# Patient Record
Sex: MALE | Race: WHITE | NOT HISPANIC OR LATINO | Employment: OTHER | URBAN - NONMETROPOLITAN AREA
[De-identification: names, ages, dates, MRNs, and addresses within clinical notes are randomized per-mention and may not be internally consistent; named-entity substitution may affect disease eponyms.]

---

## 2018-05-18 ENCOUNTER — OFFICE VISIT CONVERTED (OUTPATIENT)
Dept: FAMILY MEDICINE CLINIC | Age: 63
End: 2018-05-18
Attending: FAMILY MEDICINE

## 2019-02-26 ENCOUNTER — OFFICE VISIT CONVERTED (OUTPATIENT)
Dept: FAMILY MEDICINE CLINIC | Age: 64
End: 2019-02-26
Attending: FAMILY MEDICINE

## 2019-02-26 ENCOUNTER — HOSPITAL ENCOUNTER (OUTPATIENT)
Dept: OTHER | Facility: HOSPITAL | Age: 64
Discharge: HOME | End: 2019-02-26
Attending: FAMILY MEDICINE

## 2019-02-26 LAB
ALBUMIN SERPL-MCNC: 4.6 G/DL (ref 3.5–5)
ALBUMIN/GLOB SERPL: 1.6 {RATIO} (ref 1.4–2.6)
ALP SERPL-CCNC: 64 U/L (ref 56–155)
ALT SERPL-CCNC: 12 U/L (ref 10–40)
ANION GAP SERPL CALC-SCNC: 15 MMOL/L (ref 8–19)
AST SERPL-CCNC: 19 U/L (ref 15–50)
BILIRUB SERPL-MCNC: 0.99 MG/DL (ref 0.2–1.3)
BUN SERPL-MCNC: 18 MG/DL (ref 5–25)
BUN/CREAT SERPL: 18 {RATIO} (ref 6–20)
CALCIUM SERPL-MCNC: 9.3 MG/DL (ref 8.7–10.4)
CHLORIDE SERPL-SCNC: 99 MMOL/L (ref 99–111)
CHOLEST SERPL-MCNC: 191 MG/DL (ref 107–200)
CHOLEST/HDLC SERPL: 3 {RATIO} (ref 3–6)
CONV CO2: 28 MMOL/L (ref 22–32)
CONV TOTAL PROTEIN: 7.4 G/DL (ref 6.3–8.2)
CREAT UR-MCNC: 0.99 MG/DL (ref 0.7–1.2)
GFR SERPLBLD BASED ON 1.73 SQ M-ARVRAT: >60 ML/MIN/{1.73_M2}
GLOBULIN UR ELPH-MCNC: 2.8 G/DL (ref 2–3.5)
GLUCOSE SERPL-MCNC: 104 MG/DL (ref 70–99)
HDLC SERPL-MCNC: 63 MG/DL (ref 40–60)
LDLC SERPL CALC-MCNC: 113 MG/DL (ref 70–100)
OSMOLALITY SERPL CALC.SUM OF ELEC: 288 MOSM/KG (ref 273–304)
POTASSIUM SERPL-SCNC: 4.4 MMOL/L (ref 3.5–5.3)
SODIUM SERPL-SCNC: 138 MMOL/L (ref 135–147)
TRIGL SERPL-MCNC: 74 MG/DL (ref 40–150)
VLDLC SERPL-MCNC: 15 MG/DL (ref 5–37)

## 2019-08-26 ENCOUNTER — OFFICE VISIT CONVERTED (OUTPATIENT)
Dept: FAMILY MEDICINE CLINIC | Age: 64
End: 2019-08-26
Attending: FAMILY MEDICINE

## 2019-08-26 ENCOUNTER — HOSPITAL ENCOUNTER (OUTPATIENT)
Dept: OTHER | Facility: HOSPITAL | Age: 64
Discharge: HOME | End: 2019-08-26
Attending: FAMILY MEDICINE

## 2019-08-26 LAB
ALBUMIN SERPL-MCNC: 4.6 G/DL (ref 3.5–5)
ALBUMIN/GLOB SERPL: 1.8 {RATIO} (ref 1.4–2.6)
ALP SERPL-CCNC: 65 U/L (ref 56–155)
ALT SERPL-CCNC: 18 U/L (ref 10–40)
ANION GAP SERPL CALC-SCNC: 17 MMOL/L (ref 8–19)
AST SERPL-CCNC: 27 U/L (ref 15–50)
BILIRUB SERPL-MCNC: 1.21 MG/DL (ref 0.2–1.3)
BUN SERPL-MCNC: 15 MG/DL (ref 5–25)
BUN/CREAT SERPL: 15 {RATIO} (ref 6–20)
CALCIUM SERPL-MCNC: 9.7 MG/DL (ref 8.7–10.4)
CHLORIDE SERPL-SCNC: 100 MMOL/L (ref 99–111)
CHOLEST SERPL-MCNC: 160 MG/DL (ref 107–200)
CHOLEST/HDLC SERPL: 2.8 {RATIO} (ref 3–6)
CONV CO2: 26 MMOL/L (ref 22–32)
CONV TOTAL PROTEIN: 7.1 G/DL (ref 6.3–8.2)
CREAT UR-MCNC: 1 MG/DL (ref 0.7–1.2)
GFR SERPLBLD BASED ON 1.73 SQ M-ARVRAT: >60 ML/MIN/{1.73_M2}
GLOBULIN UR ELPH-MCNC: 2.5 G/DL (ref 2–3.5)
GLUCOSE SERPL-MCNC: 112 MG/DL (ref 70–99)
HDLC SERPL-MCNC: 58 MG/DL (ref 40–60)
LDLC SERPL CALC-MCNC: 85 MG/DL (ref 70–100)
OSMOLALITY SERPL CALC.SUM OF ELEC: 288 MOSM/KG (ref 273–304)
POTASSIUM SERPL-SCNC: 4.5 MMOL/L (ref 3.5–5.3)
SODIUM SERPL-SCNC: 138 MMOL/L (ref 135–147)
TRIGL SERPL-MCNC: 83 MG/DL (ref 40–150)
VLDLC SERPL-MCNC: 17 MG/DL (ref 5–37)

## 2020-02-27 ENCOUNTER — OFFICE VISIT CONVERTED (OUTPATIENT)
Dept: FAMILY MEDICINE CLINIC | Age: 65
End: 2020-02-27
Attending: NURSE PRACTITIONER

## 2020-03-02 ENCOUNTER — OFFICE VISIT CONVERTED (OUTPATIENT)
Dept: FAMILY MEDICINE CLINIC | Age: 65
End: 2020-03-02
Attending: FAMILY MEDICINE

## 2020-03-02 ENCOUNTER — HOSPITAL ENCOUNTER (OUTPATIENT)
Dept: OTHER | Facility: HOSPITAL | Age: 65
Discharge: HOME | End: 2020-03-02
Attending: FAMILY MEDICINE

## 2020-03-02 LAB
ALBUMIN SERPL-MCNC: 4.3 G/DL (ref 3.5–5)
ALBUMIN/GLOB SERPL: 1.3 {RATIO} (ref 1.4–2.6)
ALP SERPL-CCNC: 61 U/L (ref 56–155)
ALT SERPL-CCNC: 35 U/L (ref 10–40)
ANION GAP SERPL CALC-SCNC: 16 MMOL/L (ref 8–19)
AST SERPL-CCNC: 28 U/L (ref 15–50)
BASOPHILS # BLD MANUAL: 0.02 10*3/UL (ref 0–0.2)
BASOPHILS NFR BLD MANUAL: 0.3 % (ref 0–3)
BILIRUB SERPL-MCNC: 0.53 MG/DL (ref 0.2–1.3)
BUN SERPL-MCNC: 22 MG/DL (ref 5–25)
BUN/CREAT SERPL: 18 {RATIO} (ref 6–20)
CALCIUM SERPL-MCNC: 9.3 MG/DL (ref 8.7–10.4)
CHLORIDE SERPL-SCNC: 102 MMOL/L (ref 99–111)
CHOLEST SERPL-MCNC: 152 MG/DL (ref 107–200)
CHOLEST/HDLC SERPL: 3.7 {RATIO} (ref 3–6)
CONV CO2: 26 MMOL/L (ref 22–32)
CONV TOTAL PROTEIN: 7.6 G/DL (ref 6.3–8.2)
CREAT UR-MCNC: 1.22 MG/DL (ref 0.7–1.2)
DEPRECATED RDW RBC AUTO: 40 FL
EOSINOPHIL # BLD MANUAL: 0.03 10*3/UL (ref 0–0.7)
EOSINOPHIL NFR BLD MANUAL: 0.5 % (ref 0–7)
ERYTHROCYTE [DISTWIDTH] IN BLOOD BY AUTOMATED COUNT: 12.7 % (ref 11.5–14.5)
GFR SERPLBLD BASED ON 1.73 SQ M-ARVRAT: >60 ML/MIN/{1.73_M2}
GLOBULIN UR ELPH-MCNC: 3.3 G/DL (ref 2–3.5)
GLUCOSE SERPL-MCNC: 103 MG/DL (ref 70–99)
GRANS (ABSOLUTE): 4.15 10*3/UL (ref 2–8)
GRANS: 66.4 % (ref 30–85)
HBA1C MFR BLD: 13.9 G/DL (ref 14–18)
HCT VFR BLD AUTO: 41.8 % (ref 42–52)
HDLC SERPL-MCNC: 41 MG/DL (ref 40–60)
IMM GRANULOCYTES # BLD: 0.14 10*3/UL (ref 0–0.54)
IMM GRANULOCYTES NFR BLD: 2.2 % (ref 0–0.43)
LDLC SERPL CALC-MCNC: 72 MG/DL (ref 70–100)
LYMPHOCYTES # BLD MANUAL: 1.25 10*3/UL (ref 1–5)
LYMPHOCYTES NFR BLD MANUAL: 10.6 % (ref 3–10)
MCH RBC QN AUTO: 28.4 PG (ref 27–31)
MCHC RBC AUTO-ENTMCNC: 33.3 G/DL (ref 33–37)
MCV RBC AUTO: 85.5 FL (ref 80–96)
MONOCYTES # BLD AUTO: 0.66 10*3/UL (ref 0.2–1.2)
OSMOLALITY SERPL CALC.SUM OF ELEC: 294 MOSM/KG (ref 273–304)
PLATELET # BLD AUTO: 327 10*3/UL (ref 130–400)
PMV BLD AUTO: 8.9 FL (ref 7.4–10.4)
POTASSIUM SERPL-SCNC: 4.1 MMOL/L (ref 3.5–5.3)
RBC # BLD AUTO: 4.89 10*6/UL (ref 4.7–6.1)
SODIUM SERPL-SCNC: 140 MMOL/L (ref 135–147)
TRIGL SERPL-MCNC: 197 MG/DL (ref 40–150)
TSH SERPL-ACNC: 1.1 M[IU]/L (ref 0.27–4.2)
VARIANT LYMPHS NFR BLD MANUAL: 20 % (ref 20–45)
VLDLC SERPL-MCNC: 39 MG/DL (ref 5–37)
WBC # BLD AUTO: 6.25 10*3/UL (ref 4.8–10.8)

## 2020-04-20 ENCOUNTER — HOSPITAL ENCOUNTER (OUTPATIENT)
Dept: OTHER | Facility: HOSPITAL | Age: 65
Discharge: HOME | End: 2020-04-20
Attending: FAMILY MEDICINE

## 2020-09-01 ENCOUNTER — HOSPITAL ENCOUNTER (OUTPATIENT)
Dept: OTHER | Facility: HOSPITAL | Age: 65
Discharge: HOME | End: 2020-09-01
Attending: FAMILY MEDICINE

## 2020-09-01 ENCOUNTER — OFFICE VISIT CONVERTED (OUTPATIENT)
Dept: FAMILY MEDICINE CLINIC | Age: 65
End: 2020-09-01
Attending: FAMILY MEDICINE

## 2020-09-01 LAB
CHOLEST SERPL-MCNC: 168 MG/DL (ref 107–200)
CHOLEST/HDLC SERPL: 2.7 {RATIO} (ref 3–6)
HDLC SERPL-MCNC: 63 MG/DL (ref 40–60)
LDLC SERPL CALC-MCNC: 89 MG/DL (ref 70–100)
TRIGL SERPL-MCNC: 80 MG/DL (ref 40–150)
VLDLC SERPL-MCNC: 16 MG/DL (ref 5–37)

## 2021-03-01 ENCOUNTER — HOSPITAL ENCOUNTER (OUTPATIENT)
Dept: OTHER | Facility: HOSPITAL | Age: 66
Discharge: HOME | End: 2021-03-01
Attending: FAMILY MEDICINE

## 2021-03-01 ENCOUNTER — OFFICE VISIT CONVERTED (OUTPATIENT)
Dept: FAMILY MEDICINE CLINIC | Age: 66
End: 2021-03-01
Attending: FAMILY MEDICINE

## 2021-03-01 LAB
CHOLEST SERPL-MCNC: 174 MG/DL (ref 107–200)
CHOLEST/HDLC SERPL: 2.7 {RATIO} (ref 3–6)
HDLC SERPL-MCNC: 65 MG/DL (ref 40–60)
LDLC SERPL CALC-MCNC: 88 MG/DL (ref 70–100)
TRIGL SERPL-MCNC: 107 MG/DL (ref 40–150)
VLDLC SERPL-MCNC: 21 MG/DL (ref 5–37)

## 2021-03-08 ENCOUNTER — HOSPITAL ENCOUNTER (OUTPATIENT)
Dept: VACCINE CLINIC | Facility: HOSPITAL | Age: 66
Discharge: HOME | End: 2021-03-08
Attending: INTERNAL MEDICINE

## 2021-03-29 ENCOUNTER — HOSPITAL ENCOUNTER (OUTPATIENT)
Dept: VACCINE CLINIC | Facility: HOSPITAL | Age: 66
Discharge: HOME | End: 2021-03-29
Attending: INTERNAL MEDICINE

## 2021-05-18 NOTE — PROGRESS NOTES
Dariusz Cameron  1955     Office/Outpatient Visit    Visit Date: Tue, Sep 1, 2020 08:43 am    Provider: Jeffrey Poon MD (Assistant: Rosa Mccann LPN)    Location: [facility]        Electronically signed by Jeffrey Poon MD on  09/02/2020 08:31:17 PM                             Subjective:        CC: NEW MEDICATION, FROM [facility]     Eliquis 5mg BIDMr. Cameron is a 64 year old White male.  He is here today following a transition of care from an inpatient hospital: [facility].          HPI:       Mr. Cameron recently discharged from [facility] following hospitalization for an episode of transient confusion.  Patient states that he was driving to the [employer] course in [place] and ended up at the [employer] course.  He lived in [place] for many years knows his way around.  Just got confused.  Then he started feeling very anxious kind of like a panic attack he says.  Was experiencing some chest discomfort at the time.  Ended up driving home and his wife contacted us and we instructed her to take him to the hospital for evaluation of possible TIA.  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  He of course had EEG and MRA evaluations during the hospital stay as well.  We do have copies of those records.  I reviewed his labs from hospitalization and they looked okay but I could not find a lipid profile. Discharged home with instructions to get a follow-up MRI and follow-up with neurology but he says his appointment is not until April.  He is uneasy waiting that long to be seen.Told him that we do have a neurologist comes here to [place] that we can get an appointment with him he prefers to do that.Says that he took a week off work following his hospitalization.  Has felt good and in fact went to work yesterday and said he did not feel tired at the  "end of the day, which normally he would.  So I think the extra rest is probably done well.          Essential (primary) hypertension details; compliance with treatment has been good.  He is tolerating the medication well without side effects.  He did not bring his blood pressure diary, but says that pressures have been okay.            In regard to the mixed hyperlipidemia, compliance with treatment has been good.  He specifically denies associated symptoms, including muscle pain and weakness.      ROS:     CONSTITUTIONAL:  Negative for chills, fatigue and fever.      CARDIOVASCULAR:  Negative for chest pain, orthopnea, paroxysmal nocturnal dyspnea and pedal edema.      RESPIRATORY:  Negative for dyspnea and cough.      GASTROINTESTINAL:  Negative for abdominal pain, heartburn, constipation, diarrhea, and stool changes.      GENITOURINARY:  Negative for dysuria and polyuria.      PSYCHIATRIC:  Negative for anxiety and depression.          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      Positive for Colon Polyps - Grandfather.          Social History:     Occupation: (Self-Employed). ;     Marital Status:      Children: 3 step-children         Tobacco/Alcohol/Supplements:     Last Reviewed on 9/01/2020 08:45 AM by Rosa Mccann    Tobacco: He has a past history of cigarette smoking; quit date:  late 1990.  \"very seldom\"         Alcohol: Frequency:    \"seldom\";         Substance Abuse History:     Last Reviewed on 2/27/2020 12:41 PM by Veda Johnston    NEGATIVE         Allergies:     Last " Reviewed on 9/01/2020 08:45 AM by Rosa Mccann    No Known Allergies.        Current Medications:     Last Reviewed on 9/01/2020 08:46 AM by Rosa Mccann    Simvastatin 20 mg oral tablet [Take 1 tablet(s) by mouth daily]    lisinopriL-hydrochlorothiazide 10-12.5 mg oral tablet [Take 1 tablet(s) by mouth daily]    Eliquis 5 mg oral tablet [take 1 tablet (5 mg) by oral route 2 times per day]        Objective:        Vitals:         Current: 9/1/2020 8:48:53 AM    Ht:  6 ft, 1 in;  Wt: 177.8 lbs;  BMI: 23.5T: 96.9 F (temporal);  BP: 123/78 mm Hg (right arm, sitting);  P: 57 bpm (right arm (BP Cuff), sitting);  sCr: 1.22 mg/dL;  GFR: 62.45        Exams:     PHYSICAL EXAM:     GENERAL:  well developed and nourished; appropriately groomed; in no apparent distress;     EYES: Nonicteric and with unremarkable lids, iris and pupils;     E/N/T:  normal EACs, TMs, nasal/oral mucosa, teeth, gingiva, and oropharynx;     NECK: carotid exam reveals no bruits;     RESPIRATORY: normal respiratory rate and pattern with no distress; normal breath sounds with no rales, rhonchi, wheezes or rubs;     CARDIOVASCULAR: normal rate; rhythm is regular;  no systolic murmur;     LYMPHATIC: no enlargement of cervical or facial nodes;     MUSCULOSKELETAL: normal overall tone No pedal edema.;     NEUROLOGIC: mental status: alert and oriented x 3; cranial nerves II-XII grossly intact; coordination/cerebellar: normal finger-to-nose; ;  normal rapid alternating movements;  negative Romberg;  negative pronator drift;  No lateralizing deficit.;     PSYCHIATRIC:  appropriate affect and demeanor; normal speech pattern; grossly normal memory;         Assessment:         R41.0   Disorientation, unspecified       D68.9   Coagulation defect, unspecified       I10   Essential (primary) hypertension       E78.2   Mixed hyperlipidemia           ORDERS:         Lab Orders:       [id number]  [facility] Lipid Panel  (Send-Out)              Procedures Ordered:       REFER   Referral to Specialist or Other Facility  (Send-Out)                      Plan:         Disorientation, unspecifiedHe seems to be doing fine at the present.   We will get him into see Dr. Hull here in [place] at his request for follow-up.        REFERRALS:  Referral initiated to a neurologist ( Dr. Arabella Shaw ).            Orders:       REFER  Referral to Specialist or Other Facility  (Send-Out)              Coagulation defect, unspecifiedHe has the sagittal sinus thrombosis.Stay on the Eliquis as prescribed.         Essential (primary) hypertensionContinue on his current regimen        Mixed hyperlipidemia    LABORATORY:  Labs ordered to be performed today include lipid panel.            Orders:       [id number]  [facility]  (Send-Out)                  Other Patient Education Handouts:     Dragon transcription disclaimer:        Much of this encounter note is an electronic transcription/translation of spoken language to printed text.  The electronic translation of spoken language may permit erroneous, or at times, nonsensical words or phrases to be inadvertently transcribed.  Although I have reviewed the note for such errors, some may still exist.        Charge Capture:         Primary Diagnosis:     R41.0  Disorientation, unspecified           Orders:      [id number]  Office/outpatient visit; established patient, level 4  (In-House)              D68.9  Coagulation defect, unspecified     I10  Essential (primary) hypertension     E78.2  Mixed hyperlipidemia         ADDENDUMS:      ____________________________________    Addendum: 09/04/2020 10:23 AM - Jeffrey Poon        Remove    D68.9    Add           G08 (intracranial venous sinus (any) thrombosis)

## 2021-05-18 NOTE — PROGRESS NOTES
Dariusz Cameron  1955     Office/Outpatient Visit    Visit Date: Mon, Mar 1, 2021 08:29 am    Provider: Jeffrey Poon MD (Assistant: Mima Billingsley MA)    Location: [facility]        Electronically signed by Jeffrey Poon MD on  03/10/2021 08:22:23 AM                             Subjective:        CC: Mr. Cameron is a 65 year old White male.  This is a follow-up visit.  pt states he is not taking eliquis         HPI:       Dariusz is here for follow-up on his chronic conditions.  He has not had any more episodes of disorientation.  He did follow-up with neurology and repeat scan evidently showed no significant change so they are thinking the finding on CT was may be a anatomical variant rather than an acute thrombosis.  He was taken off of the Eliquis as a result.  Otherwise he says he is feeling fine.  He is back to work.  Tolerating his blood pressure and cholesterol medicines without difficulty.    ROS:     CONSTITUTIONAL:  Negative for chills, fatigue and fever.      CARDIOVASCULAR:  Negative for chest pain, orthopnea, paroxysmal nocturnal dyspnea and pedal edema.      RESPIRATORY:  Negative for dyspnea and cough.      GASTROINTESTINAL:  Negative for abdominal pain, heartburn, constipation, diarrhea, and stool changes.      GENITOURINARY:  Negative for dysuria and polyuria.      PSYCHIATRIC:  Negative for anxiety and depression.          Past Medical History / Family History / Social History:         Last Reviewed on 3/01/2021 09:13 AM by Jeffrey Poon    Past Medical History:             PAST MEDICAL HISTORY         Transient Confusion/Saggital Sinus Thrombus - Pathak's 8/2020         Surgical History:     Procedures:    Colonoscopy ( 2006 - normal )         Family History:         Positive for Coronary Artery Disease ( father ), Hyperlipidemia ( mother ) and Hypertension ( mother ).      Positive for Colon Polyps - Grandfather.          Social History:     Occupation:  "(Self-Employed). ;     Marital Status:      Children: 3 step-children         Tobacco/Alcohol/Supplements:     Last Reviewed on 3/01/2021 08:34 AM by Mima Billingsley    Tobacco: He has a past history of cigarette smoking; quit date:  late 1990.  \"very seldom\"         Alcohol: Frequency:    \"seldom\";         Substance Abuse History:     Last Reviewed on 2/27/2020 12:41 PM by Veda Johnston    NEGATIVE         Allergies:     Last Reviewed on 3/01/2021 08:34 AM by Mima Billingsley    No Known Allergies.        Current Medications:     Last Reviewed on 3/01/2021 08:34 AM by Mima Billingsley    Eliquis 5 mg oral tablet [take 1 tablet (5 mg) by oral route 2 times per day]    Simvastatin 20 mg oral tablet [Take 1 tablet(s) by mouth daily]    lisinopriL-hydrochlorothiazide 10-12.5 mg oral tablet [Take 1 tablet(s) by mouth daily]        Objective:        Vitals:         Current: 3/1/2021 8:33:50 AM    Ht:  6 ft, 1 in;  Wt: 183 lbs;  BMI: 24.1T: 96.7 F (temporal);  BP: 146/77 mm Hg (left arm, sitting);  P: 59 bpm (left arm (BP Cuff), sitting);  sCr: 1.22 mg/dL;  GFR: 62.44        Exams:     PHYSICAL EXAM:     GENERAL:  well developed and nourished; appropriately groomed; in no apparent distress;     EYES: Nonicteric and with unremarkable lids, iris and pupils;     E/N/T:  normal EACs, TMs, nasal/oral mucosa, teeth, gingiva, and oropharynx;     NECK: carotid exam reveals no bruits;     RESPIRATORY: normal respiratory rate and pattern with no distress; normal breath sounds with no rales, rhonchi, wheezes or rubs;     CARDIOVASCULAR: normal rate; rhythm is regular;  no systolic murmur;     LYMPHATIC: no enlargement of cervical or facial nodes;     MUSCULOSKELETAL: normal overall tone No pedal edema.;     NEUROLOGICAL:  cranial nerves, motor and sensory function, reflexes, gait and coordination are all intact;     PSYCHIATRIC:  appropriate affect and demeanor; normal speech pattern; grossly normal memory;         " Assessment:         I10   Essential (primary) hypertension       E78.2   Mixed hyperlipidemia       R41.0   Disorientation, unspecified           ORDERS:         Meds Prescribed:       [Refilled] lisinopriL-hydrochlorothiazide 20-12.5 mg oral tablet [take 1 tablet by oral route once daily], #90 (ninety) tablets, Refills: 0 (zero)       [Refilled] Simvastatin 20 mg oral tablet [Take 1 tablet(s) by mouth daily], #90 (ninety) tablets, Refills: 1 (one)         Lab Orders:       [id number]  [facility] Lipid Panel  (Send-Out)                      Plan:         Essential (primary) hypertensionBlood pressures little elevated.  Have him check a couple more blood pressures at home give us those results.  For now we will let him stay on the same medication.          Prescriptions:       [Refilled] lisinopriL-hydrochlorothiazide 20-12.5 mg oral tablet [take 1 tablet by oral route once daily], #90 (ninety) tablets, Refills: 0 (zero)         Mixed hyperlipidemiaReviewed his last lab work.  Continue on current medication    LABORATORY:  Labs ordered to be performed today include lipid panel.            Prescriptions:       [Refilled] Simvastatin 20 mg oral tablet [Take 1 tablet(s) by mouth daily], #90 (ninety) tablets, Refills: 1 (one)           Orders:       [id number]  [facility] Lipid Panel  (Send-Out)              Disorientation, unspecifiedNo recurrent episode.  He has been taken off of his Eliquis.  We will send for records from neurology and get a copy of his repeat CAT scan as well            Other Patient Education Handouts:     Dragon transcription disclaimer:        Much of this encounter note is an electronic transcription/translation of spoken language to printed text.  The electronic translation of spoken language may permit erroneous, or at times, nonsensical words or phrases to be inadvertently transcribed.  Although I have reviewed the note for such errors, some may still exist.        Charge Capture:         Primary  Diagnosis:     I10  Essential (primary) hypertension           Orders:      [id number]  Office/outpatient visit; established patient, level 4  (In-House)              E78.2  Mixed hyperlipidemia     R41.0  Disorientation, unspecified

## 2021-05-18 NOTE — PROGRESS NOTES
Dariusz Cameron  1955     Office/Outpatient Visit    Visit Date: Mon, Mar 2, 2020 11:51 am    Provider: Jeffrey Poon MD (Assistant: Rosa Mccann LPN)    Location: [facility]        Electronically signed by Jeffrey Poon MD on  03/02/2020 05:38:31 PM                             Subjective:        CC: Mr. Cameron is a 64 year old White male.  pt is here today for medication refills. pt has not had flu shot yet for this year, would like to get today if available.          HPI:           Mr. Cameron presents with essential (primary) hypertension.  Compliance with treatment has been good.  He is tolerating the medication well without side effects.  He did not bring his blood pressure diary, but says that pressures have been okay.            In regard to the mixed hyperlipidemia, compliance with treatment has been good.  He specifically denies associated symptoms, including muscle pain and weakness.        recent cough. no fever. appetite off and has had 10 lbs weight loss.  hx of smoking in past. He was also recently seen for evaluation of cough.  He had no fever.  His appetite had been off.  He is actually lost 10 pounds since that visit.  He does have a prior history of smoking.  He has not had any hemoptysis.  He was given a steroid injection at that time and says his cough has seemed to improve some but still lingers somewhat.    ROS:     CONSTITUTIONAL:  Positive for chills ( mild; last week, was seen by Veda ) and fatigue ( mild ).   Negative for fever.      CARDIOVASCULAR:  Negative for chest pain, orthopnea, paroxysmal nocturnal dyspnea and pedal edema.      RESPIRATORY:  Positive for cough.   Negative for dyspnea.      GASTROINTESTINAL:  Negative for abdominal pain, heartburn, constipation, diarrhea, and stool changes.      GENITOURINARY:  Negative for dysuria and polyuria.      PSYCHIATRIC:  Negative for anxiety and depression.          Past Medical History / Family History / Social  "History:         Last Reviewed on 3/02/2020 12:27 PM by Jeffrey Poon    Surgical History:     Procedures:    Colonoscopy ( 2006 - normal )         Family History:         Positive for Coronary Artery Disease ( father ), Hyperlipidemia ( mother ) and Hypertension ( mother ).      Positive for Colon Polyps - Grandfather.          Social History:     Occupation: (Self-Employed). ;     Marital Status:      Children: 3 step-children         Tobacco/Alcohol/Supplements:     Last Reviewed on 3/02/2020 12:07 PM by Jeffrey Poon    Tobacco: He has a past history of cigarette smoking; quit date:  late 1990.  \"very seldom\"         Alcohol: Frequency:    \"seldom\";         Substance Abuse History:     Last Reviewed on 2/27/2020 12:41 PM by Veda Johnston    NEGATIVE         Allergies:     Last Reviewed on 3/02/2020 11:58 AM by Rosa Mccann    No Known Allergies.        Current Medications:     Last Reviewed on 3/02/2020 11:58 AM by Rosa Mccann    Simvastatin 20mg Tablet [Take 1 tablet(s) by mouth daily]    Lisinopril/Hydrochlorothiazide 10mg/12.5mg Tablet [Take 1 tablet(s) by mouth daily]        Objective:        Vitals:         Current: 3/2/2020 11:58:25 AM    Ht:  6 ft, 1 in;  Wt: 174.4 lbs;  BMI: 23.0T: 97.3 F (oral);  BP: 128/77 mm Hg (right arm, sitting);  P: 72 bpm (right arm (BP Cuff), sitting);  sCr: 1 mg/dL;  GFR: 75.57        Exams:     PHYSICAL EXAM:     GENERAL:  well developed and nourished; appropriately groomed; in no apparent distress; Weight loss is noted    EYES: Nonicteric and with unremarkable lids, iris and pupils;     E/N/T:  normal EACs, TMs, nasal/oral mucosa, teeth, gingiva, and oropharynx;     NECK: carotid exam reveals no bruits;     RESPIRATORY: normal respiratory rate and pattern with no distress; no rales (\"crackles\") present; inspiratory wheezes in the right mid-lung field;     CARDIOVASCULAR: normal rate; rhythm is regular;  no systolic murmur;     LYMPHATIC: no " enlargement of cervical or facial nodes;     SKIN:  no significant rashes or lesions; no suspicious moles;     MUSCULOSKELETAL: normal overall tone No pedal edema.;     NEUROLOGIC: No lateralizing deficit.;     PSYCHIATRIC:  appropriate affect and demeanor; normal speech pattern; grossly normal memory;         Procedures:     Encounter for immunization    Regarding contraindications to an Influenza vaccine:        No contraindications were noted.              Assessment:         I10   Essential (primary) hypertension       E78.2   Mixed hyperlipidemia       R63.4   Abnormal weight loss       R05   Cough       Z23   Encounter for immunization           ORDERS:         Radiology/Test Orders:       [id number]  Radiologic exam chest 2 views  (Send-Out)              Lab Orders:       [id number]  HTNLP - [facility] CMP AND LIPID: [id number], [id number]  (Send-Out)            [id number]  BDCBC - [facility] CBC with 3 part diff  (Send-Out)            67548  TSH - [facility] TSH  (Send-Out)              Procedures Ordered:       [id number]  Immunization administration; one vaccine  (In-House)              Other Orders:       [id number]  Influenza virus vaccine, quadrivalent, split virus, preservative free 3 years of age & older  (In-House)                      Plan:         Essential (primary) hypertensionContinue on current medication seems to be doing well.  Also keep in mind possibility lisinopril associated with cough if fails to improve        Mixed hyperlipidemia    LABORATORY:  Labs ordered to be performed today include HTN/Lipid Panel: CMP, Lipid.            Orders:       [id number]  HTNLP - [facility] CMP AND LIPID: [id number], [id number]  (Send-Out)              Abnormal weight loss    LABORATORY:  Labs ordered to be performed today include CBC and TSH.            Orders:       [id number]  BDCBC - [facility] CBC with 3 part diff  (Send-Out)            44123  TSH - [facility] TSH  (Send-Out)              CoughSymptomatically he is improved which is encouraging.  However he has had the weight loss and noted to  have wheeze on exam which is concerning.  I am getting a chest x-ray for further evaluation in addition to the labs as noted above.If labs and x-ray look okay but his cough persist or worsens of asked him to call back and let me know.  We might give him a trial of albuterol for treatment at that point.        RADIOLOGY:  I have ordered a chest x-ray (PA and lateral) to be done today.            Orders:       [id number]  Radiologic exam chest 2 views  (Send-Out)              Encounter for immunization          Immunizations:       00571  Influenza virus vaccine, quadrivalent, split virus, preservative free 3 years of age & older  (In-House)                Dose (ml): 0.5  Site: left deltoid  Route: intramuscular  Administered by: Taylor Mendoza          : [employer]  Lot #: [id number]  Exp: 06/30/2020          NDC: [address]-98      11497  Immunization administration; one vaccine  (In-House)                  Other Patient Education Handouts:     Dragon transcription disclaimer:        Much of this encounter note is an electronic transcription/translation of spoken language to printed text.  The electronic translation of spoken language may permit erroneous, or at times, nonsensical words or phrases to be inadvertently transcribed.  Although I have reviewed the note for such errors, some may still exist.        Charge Capture:         Primary Diagnosis:     I10  Essential (primary) hypertension           Orders:      [id number]  Office/outpatient visit; established patient, level 4  (In-House)              E78.2  Mixed hyperlipidemia     R63.4  Abnormal weight loss     R05  Cough     Z23  Encounter for immunization           Orders:      [id number]  Influenza virus vaccine, quadrivalent, split virus, preservative free 3 years of age & older  (In-House)            91591  Immunization administration; one vaccine  (In-House)

## 2021-05-18 NOTE — PROGRESS NOTES
Dariusz Cameron 1955     Office/Outpatient Visit    Visit Date: Mon, Aug 26, 2019 09:20 am    Provider: Jeffrey Poon MD (Assistant: Sarah Spurling, MA)    Location: [facility]        Electronically signed by Jeffrey Poon MD on  08/26/2019 10:13:34 AM                             SUBJECTIVE:        CC:     Mr. Cameron is a 63 year old White male.  This is a follow-up visit.          HPI:         Patient to be evaluated for hypertension.  He did not bring his blood pressure diary, but says that pressures have been okay.  He is tolerating the medication well without side effects.  Compliance with treatment has been good.  He did a two mile run the other day and was tired a few days later.         Dx with mixed hyperlipidemia; compliance with treatment has been good.  He specifically denies associated symptoms, including muscle pain and weakness.      ROS:     CONSTITUTIONAL:  Negative for chills, fatigue and fever.      CARDIOVASCULAR:  Negative for chest pain, orthopnea, paroxysmal nocturnal dyspnea and pedal edema.      RESPIRATORY:  Negative for dyspnea and cough.      GASTROINTESTINAL:  Negative for abdominal pain, heartburn, constipation, diarrhea, and stool changes.      GENITOURINARY:  Negative for dysuria and polyuria.      PSYCHIATRIC:  Negative for anxiety and depression.          PMH/FMH/SH:     Last Reviewed on 8/26/2019 10:03 AM by Jeffrey Poon    Surgical History:     Procedures:    Colonoscopy ( 2006 - normal )         Family History:         Positive for Coronary Artery Disease ( father ), Hyperlipidemia ( mother ) and Hypertension ( mother ).      Positive for Colon Polyps - Grandfather.          Social History:     Occupation: (Self-Employed). ;     Marital Status:      Children: 3 step-children         Tobacco/Alcohol/Supplements:     Last Reviewed on 8/26/2019 09:22 AM by Spurling, Sarah C    Tobacco: He has a past history of cigarette smoking; quit  "date:  late 1990s, very seldom.  \"very seldom\"         Substance Abuse History:     NEGATIVE             Allergies:     Last Reviewed on 8/26/2019 09:22 AM by Spurling, Sarah C      No Known Drug Allergies.         Current Medications:     Last Reviewed on 8/26/2019 09:22 AM by Spurling, Sarah C    Lisinopril/Hydrochlorothiazide 10mg/12.5mg Tablet Take 1 tablet(s) by mouth daily     Simvastatin 20mg Tablet Take 1 tablet(s) by mouth daily         OBJECTIVE:        Vitals:         Current: 8/26/2019 9:25:30 AM    Ht:  6 ft, 1 in;  Wt: 176 lbs;  BMI: 23.2    T: 97.9 F (oral);  BP: 120/76 mm Hg (left arm, sitting);  P: 54 bpm (left arm (BP Cuff), sitting);  sCr: 0.99 mg/dL;  GFR: 77.58        Exams:     PHYSICAL EXAM:     GENERAL:  well developed and nourished; appropriately groomed; in no apparent distress;     EYES: Nonicteric and with unremarkable lids, iris and pupils;     E/N/T:  normal EACs, TMs, nasal/oral mucosa, teeth, gingiva, and oropharynx;     NECK: carotid exam reveals no bruits;     RESPIRATORY: normal respiratory rate and pattern with no distress; normal breath sounds with no rales, rhonchi, wheezes or rubs;     CARDIOVASCULAR: normal rate; rhythm is regular;  no systolic murmur;     LYMPHATIC: no enlargement of cervical or facial nodes;     SKIN:  no significant rashes or lesions; no suspicious moles;     MUSCULOSKELETAL: normal overall tone No pedal edema.;     NEUROLOGIC: No lateralizing deficit.;     PSYCHIATRIC:  appropriate affect and demeanor; normal speech pattern; grossly normal memory;         ASSESSMENT           401.1   I10  Hypertension              DDx:     272.2   E78.2  Mixed hyperlipidemia              DDx:     V79.0   Z13.31  Screening for depression              DDx:         ORDERS:         Meds Prescribed:       Refill of: Lisinopril/Hydrochlorothiazide 10mg/12.5mg Tablet Take 1 tablet(s) by mouth daily  #90 (Ninety) tablet(s) Refills: 1       Refill of: Simvastatin 20mg Tablet Take 1 " tablet(s) by mouth daily  #90 (Ninety) tablet(s) Refills: 1         Lab Orders:       [id number]  HTN - [facility] CMP AND LIPID: [id number]  (Send-Out)           Other Orders:         Depression screen negative  (In-House)                   PLAN:          Hypertension           Prescriptions:       Refill of: Lisinopril/Hydrochlorothiazide 10mg/12.5mg Tablet Take 1 tablet(s) by mouth daily  #90 (Ninety) tablet(s) Refills: 1       Refill of: Simvastatin 20mg Tablet Take 1 tablet(s) by mouth daily  #90 (Ninety) tablet(s) Refills: 1          Mixed hyperlipidemia     LABORATORY:  Labs ordered to be performed today include HTN/Lipid Panel: CMP, Lipid.            Orders:       [id number]  HTN - [facility] CMP AND LIPID: [id number], [id number]  (Send-Out)            Screening for depression     MIPS PHQ-9 Depression Screening: Completed form scanned and in chart; Total Score 1; Negative Depression Screen           Orders:         Depression screen negative  (In-House)               CHARGE CAPTURE           **Please note: ICD descriptions below are intended for billing purposes only and may not represent clinical diagnoses**        Primary Diagnosis:         401.1 Hypertension            I10    Essential (primary) hypertension              Orders:          [id number]   Office/outpatient visit; established patient, level 3  (In-House)           272.2 Mixed hyperlipidemia            E78.2    Mixed hyperlipidemia    V79.0 Screening for depression            Z13.31    Encounter for screening for depression              Orders:             Depression screen negative  (In-House)

## 2021-05-18 NOTE — PROGRESS NOTES
Dariusz Cameron  1955     Office/Outpatient Visit    Visit Date: Thu, Feb 27, 2020 12:18 pm    Provider: Veda Johnston N.P. (Assistant: Lauren Koehler MA)    Location: [facility]        Electronically signed by Veda Johnston N.P. on  02/27/2020 01:35:35 PM                             Subjective:        CC: Mr. Cameron is a 64 year old White male.  He presents with fatigue and weakness, no appetite x 5 days, he states he has lost 8 pounds.          HPI: 64-year-old male presenting to clinic complaining of 5-day history of fatigue, decreased appetite, nasal congestion and mild cough at night.  Patient denies fever, chills, night sweats, shortness of air or chest pain.  Wife had same symptoms earlier this week. Pt has not tried any otc medications. States he didn't feel as if had the energy to come to office today.    ROS:     CONSTITUTIONAL:  Negative for unintentional weight loss ( 8 pounds in 1 week pounds ).      EYES:  Negative for blurred vision.      E/N/T:  Negative for ear pain, diminished hearing, frequent rhinorrhea, sinus pressure and sore throat.      CARDIOVASCULAR:  Negative for chest pain, dizziness and palpitations.      RESPIRATORY:  Negative for dyspnea, hemoptysis and frequent wheezing.      GASTROINTESTINAL:  Negative for abdominal pain, constipation, diarrhea, nausea and vomiting.      GENITOURINARY:  Negative for dysuria.      MUSCULOSKELETAL:  Negative for arthralgias and myalgias.      INTEGUMENTARY:  Negative for rash.      NEUROLOGICAL:  Positive for weakness ( generalized ).   Negative for dizziness, fainting, headaches or paresthesias.          Past Medical History / Family History / Social History:         Last Reviewed on 2/27/2020 12:41 PM by Veda Johnston    Surgical History:     Procedures:    Colonoscopy ( 2006 - normal )         Family History:         Positive for Coronary Artery Disease ( father ), Hyperlipidemia ( mother ) and Hypertension (  "mother ).      Positive for Colon Polyps - Grandfather.          Social History:     Occupation: (Self-Employed). ;     Marital Status:      Children: 3 step-children         Tobacco/Alcohol/Supplements:     Last Reviewed on 2/27/2020 12:41 PM by Veda Johnston    Tobacco: He has a past history of cigarette smoking; quit date:  late 1990.  \"very seldom\"         Alcohol: Frequency:    \"seldom\";         Substance Abuse History:     Last Reviewed on 2/27/2020 12:41 PM by Veda Johnston    NEGATIVE         Immunizations:     zzFluzone pf-quadrivalent 3 and up 2/3/2014        Allergies:     Last Reviewed on 2/27/2020 12:41 PM by Veda Johnston    No Known Allergies.        Current Medications:     Last Reviewed on 2/27/2020 12:41 PM by Veda Johnsotn    Simvastatin 20mg Tablet [Take 1 tablet(s) by mouth daily]    Lisinopril/Hydrochlorothiazide 10mg/12.5mg Tablet [Take 1 tablet(s) by mouth daily]        Objective:        Vitals:         Current: 2/27/2020 12:24:31 PM    Ht:  6 ft, 1 in;  Wt: 175 lbs;  BMI: 23.1T: 97.4 F (oral);  BP: 106/61 mm Hg (left arm, sitting);  P: 69 bpm (left arm (BP Cuff), sitting);  sCr: 1 mg/dL;  GFR: 75.68        Exams:     PHYSICAL EXAM:     GENERAL: Vitals recorded well developed, well nourished;  well groomed;  no apparent distress;     EYES: conjunctiva and cornea are normal; PERRLA;     E/N/T: OROPHARYNX:  normal mucosa, dentition, gingiva, and posterior pharynx;     NECK: range of motion is normal; thyroid exam reveals not enlarged;     RESPIRATORY: normal respiratory rate and pattern with no distress; normal breath sounds with no rales, rhonchi, wheezes or rubs;     CARDIOVASCULAR: normal rate; rhythm is regular;  no systolic murmur; no edema;     LYMPHATIC: no enlargement of cervical or facial nodes;     SKIN:  no rash; no jaundice, good turgor;     MUSCULOSKELETAL: normal gait; normal overall tone     NEUROLOGIC: mental status: alert and oriented x 3;         " Lab/Test Results:         Mono: Negative (02/27/2020),     Performed by:: TL (02/27/2020),             Assessment:         B33.8   Other specified viral diseases       R53.83   Other fatigue           ORDERS:         Lab Orders:       [id number]  Rapid mono test  (In-House)              Procedures Ordered:       [id number]  Collection of capillary blood specimen (eg, finger, heel, ear stick)  (In-House)              Other Orders:       [id number]  Therapeutic injection  (In-House)                      Plan:         Other specified viral diseasesNo evidence of bacterial infection at this time. Explained to patient that antibiotic will not help.  Patient has a routine scheduled appointment with PCP, Dr. Poon on Monday. Will defer any laboratory testing today for fatigue to see if treatment helps. Encourage patient to take Zyrtec and Flonase daily.  Encouraged patient to try Benadryl at night to help with congestion and mild cough at night. Steroid injection given in office today.  Increase fluid intake to avoid dehydration.    Steroids Kenalog 40 mg 1 ml           Orders:       [id number]  Therapeutic injection  (In-House)              Other fatigueMonospot performed in office today. Negative.    LABORATORY:  Labs ordered to be performed today include monospot.            Orders:       [id number]  Rapid mono test  (In-House)            47381  Collection of capillary blood specimen (eg, finger, heel, ear stick)  (In-House)                  Charge Capture:         Primary Diagnosis:     B33.8  Other specified viral diseases           Orders:      [id number]  Office/outpatient visit; established patient, level 3  (In-House)            [id number]  Therapeutic injection  (In-House)              R53.83  Other fatigue           Orders:      [id number]  Rapid mono test  (In-House)            77883  Collection of capillary blood specimen (eg, finger, heel, ear stick)  (In-House)                  ADDENDUMS:      ____________________________________    Addendum:  03/11/2020 09:23 AM - Lauren Koehler        Name of medication/dose : Kenalog, 40mg     Route: Left Deltoid     Administered by: KG     Lot # [id number]    Expiration date: 08/01/2021

## 2021-05-18 NOTE — PROGRESS NOTES
Dariusz Cameron 1955     Office/Outpatient Visit    Visit Date: Fri, May 18, 2018 12:24 pm    Provider: Jeffrey Poon MD (Assistant: Thelma Mcclelland MA)    Location: [facility]        Electronically signed by Jeffrey Poon MD on  05/28/2018 11:22:53 AM                             SUBJECTIVE:        CC:     Mr. Cameron is a 62 year old White male.  This is a follow-up visit.  check up for med refills         HPI:         Patient presents with hypertension.  Mr. Cameron does not check his blood pressure other than at his clinic appointments.  He is tolerating the medication well without side effects.  Compliance with treatment has been good.          With regard to the mixed hyperlipidemia, compliance with treatment has been good.  He specifically denies associated symptoms, including muscle pain and weakness.      ROS:     CONSTITUTIONAL:  Negative for chills, fatigue, fever and weight change.      CARDIOVASCULAR:  Negative for chest pain, orthopnea, paroxysmal nocturnal dyspnea and pedal edema.      RESPIRATORY:  Negative for dyspnea and cough.      GASTROINTESTINAL:  Negative for abdominal pain, heartburn, constipation, diarrhea, and stool changes.      GENITOURINARY:  Negative for dysuria and polyuria.      PSYCHIATRIC:  Negative for anxiety and depression.          PMH/FMH/SH:     Last Reviewed on 12/28/2012 01:41 PM by Joann Toro    Surgical History:     Procedures:    Colonoscopy ( 2006 - normal )         Family History:         Positive for Coronary Artery Disease ( father ), Hyperlipidemia ( mother ) and Hypertension ( mother ).      Positive for Colon Polyps - Grandfather.          Social History:     Occupation: (Self-Employed). ;     Marital Status:      Children: 3 step-children         Tobacco/Alcohol/Supplements:     Last Reviewed on 3/13/2017 11:46 AM by Enmanuel Venegas    Tobacco: He has a past history of cigarette smoking; quit date:  late 1990s, very  "seldom.  \"very seldom\"         Substance Abuse History:     NEGATIVE             Allergies:     Last Reviewed on 5/18/2018 12:28 PM by Thelma Mcclelland      No Known Drug Allergies.         Current Medications:     Last Reviewed on 5/18/2018 12:28 PM by Thelma Mcclelland    Lisinopril 10mg Tablet Take 1 tablet(s) by mouth daily     Simvastatin 20mg Tablet Take 1 tablet(s) by mouth daily         OBJECTIVE:        Vitals:         Current: 5/18/2018 12:26:46 PM    Ht:  6 ft, 1 in;  Wt: 182 lbs;  BMI: 24.0    T: 98.5 F (oral);  BP: 144/82 mm Hg (left arm, standing);  P: 53 bpm (left arm (BP Cuff), sitting);  sCr: 1.12 mg/dL;  GFR: 70.42        Repeat:     1:09:33 PM     BP:   150/90mm Hg (left arm, sitting, by stiles)         Exams:     PHYSICAL EXAM:     GENERAL:  well developed and nourished; appropriately groomed; in no apparent distress;     EYES: Nonicteric and with unremarkable lids, iris and pupils;     E/N/T:  normal EACs, TMs, nasal/oral mucosa, teeth, gingiva, and oropharynx;     NECK: carotid exam reveals no bruits;     RESPIRATORY: normal respiratory rate and pattern with no distress; normal breath sounds with no rales, rhonchi, wheezes or rubs;     CARDIOVASCULAR: normal rate; rhythm is regular;  no systolic murmur;     LYMPHATIC: no enlargement of cervical or facial nodes;     MUSCULOSKELETAL: normal overall tone No pedal edema.;     NEUROLOGIC: No lateralizing deficit.;     PSYCHIATRIC:  appropriate affect and demeanor; normal speech pattern; grossly normal memory;         ASSESSMENT           401.1   I10  Hypertension              DDx:     272.2   E78.2  Mixed hyperlipidemia              DDx:         ORDERS:         Meds Prescribed:       Lisinopril/Hydrochlorothiazide 10mg/12.5mg Tablet Take 1 tablet(s) by mouth daily  #90 (Ninety) tablet(s) Refills: 0       Refill of: Simvastatin 20mg Tablet Take 1 tablet(s) by mouth daily  #90 (Ninety) tablet(s) Refills: 0         Lab Orders:       [id number]  HTNLP " [facility] CMP AND LIPID: [id number], [id number]  (Send-Out)                   PLAN:          Hypertension add back the HCTZ           Prescriptions:       Lisinopril/Hydrochlorothiazide 10mg/12.5mg Tablet Take 1 tablet(s) by mouth daily  #90 (Ninety) tablet(s) Refills: 0          Mixed hyperlipidemia     LABORATORY:  Labs ordered to be performed today include HTN/Lipid Panel: CMP, Lipid.            Prescriptions:       Refill of: Simvastatin 20mg Tablet Take 1 tablet(s) by mouth daily  #90 (Ninety) tablet(s) Refills: 0           [facility]:       [id number] [id number][facility] [id number]  (Send-Out)               CHARGE CAPTURE           **Please note: ICD descriptions below are intended for billing purposes only and may not represent clinical diagnoses**        Primary Diagnosis:         401.1 Hypertension            I10    Essential (primary) hypertension              Orders:          [id number]   Office/outpatient visit; established patient, level 3  (In-House)           272.2 Mixed hyperlipidemia            E78.2    Mixed hyperlipidemia

## 2021-05-18 NOTE — PROGRESS NOTES
"Dariusz Cameron 1955     Office/Outpatient Visit    Visit Date: Tue, Feb 26, 2019 11:01 am    Provider: Jeffrey Poon MD (Assistant: Sarah Spurling, MA)    Location: [facility]        Electronically signed by Jeffrey Poon MD on  02/27/2019 08:52:53 AM                             SUBJECTIVE:        CC:     Mr. Cameron is a 63 year old White male.  This is a follow-up visit.  Med refills.          HPI:         Mr. Cameron presents with hypertension.  Compliance with treatment has been good; he takes his medication as directed and follows up as directed.  Although he has no formal exercise routine, he is very active in his day-to-day work.         Mixed hyperlipidemia details; compliance with treatment has been good; he takes his medication as directed and follows up as directed.      ROS:     CONSTITUTIONAL:  Negative for chills, fatigue, fever and weight change.      CARDIOVASCULAR:  Negative for chest pain, orthopnea, paroxysmal nocturnal dyspnea and pedal edema.      RESPIRATORY:  Negative for dyspnea and cough.      GASTROINTESTINAL:  Negative for abdominal pain, heartburn, constipation, diarrhea, and stool changes.      GENITOURINARY:  Negative for dysuria and polyuria.      PSYCHIATRIC:  Negative for anxiety and depression.          PMH/FMH/SH:     Last Reviewed on 12/28/2012 01:41 PM by Joann Toro    Surgical History:     Procedures:    Colonoscopy ( 2006 - normal )         Family History:         Positive for Coronary Artery Disease ( father ), Hyperlipidemia ( mother ) and Hypertension ( mother ).      Positive for Colon Polyps - Grandfather.          Social History:     Occupation: (Self-Employed). ;     Marital Status:      Children: 3 step-children         Tobacco/Alcohol/Supplements:     Last Reviewed on 5/18/2018 12:28 PM by Thelma Mcclelland    Tobacco: He has a past history of cigarette smoking; quit date:  late 1990s, very seldom.  \"very seldom\"         " Substance Abuse History:     NEGATIVE             Allergies:     Last Reviewed on 5/18/2018 12:28 PM by Thelma Mcclelland      No Known Drug Allergies.         Current Medications:     Last Reviewed on 5/18/2018 12:28 PM by Thelma Mcclelland    Lisinopril/Hydrochlorothiazide 10mg/12.5mg Tablet Take 1 tablet(s) by mouth daily     Simvastatin 20mg Tablet Take 1 tablet(s) by mouth daily         OBJECTIVE:        Vitals:         Current: 2/26/2019 11:06:35 AM    Ht:  6 ft, 1 in;  Wt: 183 lbs;  BMI: 24.1    T: 98.2 F (oral);  BP: 117/72 mm Hg (left arm, sitting);  P: 56 bpm (left arm (BP Cuff), sitting);  sCr: 0.98 mg/dL;  GFR: 79.69        Exams:     PHYSICAL EXAM:     GENERAL:  well developed and nourished; appropriately groomed; in no apparent distress;     EYES: Nonicteric and with unremarkable lids, iris and pupils;     NECK: carotid exam reveals no bruits;     RESPIRATORY: normal respiratory rate and pattern with no distress; normal breath sounds with no rales, rhonchi, wheezes or rubs;     CARDIOVASCULAR: normal rate; rhythm is regular;  no systolic murmur;     LYMPHATIC: no enlargement of cervical or facial nodes;     MUSCULOSKELETAL: normal overall tone No pedal edema.;     NEUROLOGIC: No lateralizing deficit.;     PSYCHIATRIC:  appropriate affect and demeanor; normal speech pattern; grossly normal memory;         ASSESSMENT           401.1   I10  Hypertension              DDx:     272.2   E78.2  Mixed hyperlipidemia              DDx:         ORDERS:         Meds Prescribed:       Refill of: Simvastatin 20mg Tablet Take 1 tablet(s) by mouth daily  #90 (Ninety) tablet(s) Refills: 1       Refill of: Lisinopril/Hydrochlorothiazide 10mg/12.5mg Tablet Take 1 tablet(s) by mouth daily  #90 (Ninety) tablet(s) Refills: 1         Lab Orders:       [id number]  HTNLP - [facility] CMP AND LIPID: [id number], [id number]  (Send-Out)         APPTO  Appointment need  (In-House)                   PLAN: decline Hep A vaccine          Hypertension          FOLLOW-UP: Schedule a follow-up visit in 6 months..            Prescriptions:       Refill of: Lisinopril/Hydrochlorothiazide 10mg/12.5mg Tablet Take 1 tablet(s) by mouth daily  #90 (Ninety) tablet(s) Refills: 1           Orders:       APPTO  Appointment need  (In-House)            Mixed hyperlipidemia     LABORATORY:  Labs ordered to be performed today include HTN/Lipid Panel: CMP, Lipid.            Prescriptions:       Refill of: Simvastatin 20mg Tablet Take 1 tablet(s) by mouth daily  #90 (Ninety) tablet(s) Refills: 1           Orders:       [id number]  HTN - [facility] CMP AND LIPID: [id number], [id number]  (Send-Out)               Other Orders: Dragon transcription disclaimer:        Much of this encounter note is an electronic transcription/translation of spoken language to printed text.  The electronic translation of spoken language may permit erroneous, or at times, nonsensical words or phrases to be inadvertently transcribed.  Although I have reviewed the note for such errors, some may still exist.         Patient Recommendations:        For  Hypertension:     Schedule a follow-up visit in 6 months.                APPOINTMENT INFORMATION:        Monday Tuesday Wednesday Thursday Friday Saturday Sunday            Time:___________________AM  PM   Date:_____________________             CHARGE CAPTURE           **Please note: ICD descriptions below are intended for billing purposes only and may not represent clinical diagnoses**        Primary Diagnosis:         401.1 Hypertension            I10    Essential (primary) hypertension              Orders:          [id number]   Office/outpatient visit; established patient, level 3  (In-House)             APPTO   Appointment need  (In-House)           272.2 Mixed hyperlipidemia            E78.2    Mixed hyperlipidemia

## 2021-07-01 VITALS
DIASTOLIC BLOOD PRESSURE: 72 MMHG | WEIGHT: 183 LBS | BODY MASS INDEX: 24.25 KG/M2 | HEART RATE: 56 BPM | TEMPERATURE: 98.2 F | HEIGHT: 73 IN | SYSTOLIC BLOOD PRESSURE: 117 MMHG

## 2021-07-01 VITALS
TEMPERATURE: 98.5 F | HEART RATE: 53 BPM | DIASTOLIC BLOOD PRESSURE: 90 MMHG | SYSTOLIC BLOOD PRESSURE: 150 MMHG | HEIGHT: 73 IN | BODY MASS INDEX: 24.12 KG/M2 | WEIGHT: 182 LBS

## 2021-07-01 VITALS
BODY MASS INDEX: 23.33 KG/M2 | TEMPERATURE: 97.9 F | HEART RATE: 54 BPM | HEIGHT: 73 IN | WEIGHT: 176 LBS | DIASTOLIC BLOOD PRESSURE: 76 MMHG | SYSTOLIC BLOOD PRESSURE: 120 MMHG

## 2021-07-02 VITALS
HEART RATE: 59 BPM | WEIGHT: 183 LBS | DIASTOLIC BLOOD PRESSURE: 77 MMHG | TEMPERATURE: 96.7 F | SYSTOLIC BLOOD PRESSURE: 146 MMHG | HEIGHT: 73 IN | BODY MASS INDEX: 24.25 KG/M2

## 2021-07-02 VITALS
HEIGHT: 73 IN | TEMPERATURE: 97.3 F | HEART RATE: 72 BPM | BODY MASS INDEX: 23.11 KG/M2 | DIASTOLIC BLOOD PRESSURE: 77 MMHG | SYSTOLIC BLOOD PRESSURE: 128 MMHG | WEIGHT: 174.4 LBS

## 2021-07-02 VITALS
WEIGHT: 175 LBS | HEIGHT: 73 IN | SYSTOLIC BLOOD PRESSURE: 106 MMHG | BODY MASS INDEX: 23.19 KG/M2 | HEART RATE: 69 BPM | DIASTOLIC BLOOD PRESSURE: 61 MMHG | TEMPERATURE: 97.4 F

## 2021-07-02 VITALS
WEIGHT: 177.8 LBS | SYSTOLIC BLOOD PRESSURE: 123 MMHG | HEIGHT: 73 IN | HEART RATE: 57 BPM | TEMPERATURE: 96.9 F | BODY MASS INDEX: 23.56 KG/M2 | DIASTOLIC BLOOD PRESSURE: 78 MMHG

## 2021-08-20 RX ORDER — SIMVASTATIN 20 MG
1 TABLET ORAL NIGHTLY
COMMUNITY
Start: 2021-08-19 | End: 2022-02-21

## 2021-08-20 RX ORDER — LISINOPRIL AND HYDROCHLOROTHIAZIDE 20; 12.5 MG/1; MG/1
TABLET ORAL
Qty: 90 TABLET | Refills: 0 | Status: SHIPPED | OUTPATIENT
Start: 2021-08-20 | End: 2022-02-21

## 2021-08-20 RX ORDER — LISINOPRIL AND HYDROCHLOROTHIAZIDE 20; 12.5 MG/1; MG/1
1 TABLET ORAL DAILY
COMMUNITY
End: 2021-08-20

## 2021-09-03 ENCOUNTER — OFFICE VISIT (OUTPATIENT)
Dept: FAMILY MEDICINE CLINIC | Age: 66
End: 2021-09-03

## 2021-09-03 ENCOUNTER — LAB (OUTPATIENT)
Dept: LAB | Facility: HOSPITAL | Age: 66
End: 2021-09-03

## 2021-09-03 VITALS
HEIGHT: 73 IN | WEIGHT: 180.3 LBS | DIASTOLIC BLOOD PRESSURE: 69 MMHG | TEMPERATURE: 97.9 F | SYSTOLIC BLOOD PRESSURE: 131 MMHG | BODY MASS INDEX: 23.89 KG/M2

## 2021-09-03 DIAGNOSIS — Z12.11 COLON CANCER SCREENING: ICD-10-CM

## 2021-09-03 DIAGNOSIS — I10 ESSENTIAL HYPERTENSION: ICD-10-CM

## 2021-09-03 DIAGNOSIS — G08 CEREBRAL VENOUS SINUS THROMBOSIS: ICD-10-CM

## 2021-09-03 DIAGNOSIS — Z12.5 SCREENING FOR PROSTATE CANCER: ICD-10-CM

## 2021-09-03 DIAGNOSIS — E78.2 MIXED HYPERLIPIDEMIA: Primary | ICD-10-CM

## 2021-09-03 PROBLEM — E78.5 HLD (HYPERLIPIDEMIA): Status: ACTIVE | Noted: 2020-08-18

## 2021-09-03 LAB
ALBUMIN SERPL-MCNC: 4.8 G/DL (ref 3.5–5.2)
ALBUMIN/GLOB SERPL: 1.9 G/DL
ALP SERPL-CCNC: 65 U/L (ref 39–117)
ALT SERPL W P-5'-P-CCNC: 11 U/L (ref 1–41)
ANION GAP SERPL CALCULATED.3IONS-SCNC: 8.6 MMOL/L (ref 5–15)
AST SERPL-CCNC: 18 U/L (ref 1–40)
BILIRUB SERPL-MCNC: 0.8 MG/DL (ref 0–1.2)
BUN SERPL-MCNC: 21 MG/DL (ref 8–23)
BUN/CREAT SERPL: 18.6 (ref 7–25)
CALCIUM SPEC-SCNC: 9.6 MG/DL (ref 8.6–10.5)
CHLORIDE SERPL-SCNC: 101 MMOL/L (ref 98–107)
CHOLEST SERPL-MCNC: 168 MG/DL (ref 0–200)
CO2 SERPL-SCNC: 27.4 MMOL/L (ref 22–29)
CREAT SERPL-MCNC: 1.13 MG/DL (ref 0.76–1.27)
GFR SERPL CREATININE-BSD FRML MDRD: 66 ML/MIN/1.73
GFR SERPL CREATININE-BSD FRML MDRD: 79 ML/MIN/1.73
GLOBULIN UR ELPH-MCNC: 2.5 GM/DL
GLUCOSE SERPL-MCNC: 110 MG/DL (ref 65–99)
HDLC SERPL-MCNC: 54 MG/DL (ref 40–60)
LDLC SERPL CALC-MCNC: 98 MG/DL (ref 0–100)
LDLC/HDLC SERPL: 1.79 {RATIO}
POTASSIUM SERPL-SCNC: 4.6 MMOL/L (ref 3.5–5.2)
PROT SERPL-MCNC: 7.3 G/DL (ref 6–8.5)
PSA SERPL-MCNC: 23 NG/ML (ref 0–4)
SODIUM SERPL-SCNC: 137 MMOL/L (ref 136–145)
TRIGL SERPL-MCNC: 86 MG/DL (ref 0–150)
VLDLC SERPL-MCNC: 16 MG/DL (ref 5–40)

## 2021-09-03 PROCEDURE — 80061 LIPID PANEL: CPT | Performed by: FAMILY MEDICINE

## 2021-09-03 PROCEDURE — 36415 COLL VENOUS BLD VENIPUNCTURE: CPT | Performed by: FAMILY MEDICINE

## 2021-09-03 PROCEDURE — 99214 OFFICE O/P EST MOD 30 MIN: CPT | Performed by: FAMILY MEDICINE

## 2021-09-03 PROCEDURE — 80053 COMPREHEN METABOLIC PANEL: CPT | Performed by: FAMILY MEDICINE

## 2021-09-03 PROCEDURE — G0103 PSA SCREENING: HCPCS | Performed by: FAMILY MEDICINE

## 2021-09-03 NOTE — ASSESSMENT & PLAN NOTE
Currently tolerating medications well.  Continue current regimen.  Check lab and predicate medication change based on results

## 2021-09-03 NOTE — ASSESSMENT & PLAN NOTE
He is overdue for screening colonoscopy will get that scheduled for him.  His brother in law works at [employer]

## 2021-09-03 NOTE — PROGRESS NOTES
"Chief Complaint  Hypertension (6 month follow up ) and Hyperlipidemia    Subjective          Dariusz Cameron presents to [facility]  History of Present Illness  Patient here for follow-up on his chronic health issues including hypertension hyperlipidemia.  He has not had any more events with transient confusion since last visit.  He did follow-up with hematologist Sravan Ford MD, PhD, MPH, MS 3/25/2021 4:07 PM and that note was reviewed in care everywhere.  He was discontinued from Eliquis.  Continues to do quite well.  Returned to work, he is a , but trying to avoid being outside in the heat.      Review of Systems   Constitutional: Negative for chills, fatigue and fever.   Respiratory: Negative for chest tightness, shortness of breath and wheezing.    Cardiovascular: Negative for chest pain and palpitations.   Gastrointestinal: Negative for constipation, diarrhea, nausea and vomiting.   Genitourinary: Negative for dysuria, hematuria and urgency.   Neurological: Negative for weakness and headache.   Psychiatric/Behavioral: Negative for hallucinations.        Objective   Vital Signs:   /69 (BP Location: Left arm, Patient Position: Sitting, Cuff Size: Adult)   Temp 97.9 °F (36.6 °C) (Oral)   Ht 185.4 cm (73\")   Wt 81.8 kg (180 lb 4.8 oz)   BMI 23.79 kg/m²     Physical Exam  Constitutional:       Appearance: Normal appearance.   Neck:      Vascular: No carotid bruit.   Cardiovascular:      Rate and Rhythm: Normal rate and regular rhythm.      Heart sounds: Normal heart sounds. No murmur heard.     Pulmonary:      Effort: No respiratory distress.      Breath sounds: No wheezing or rales.   Musculoskeletal:      Right lower leg: No edema.      Left lower leg: No edema.   Lymphadenopathy:      Cervical: No cervical adenopathy.   Neurological:      General: No focal deficit present.      Mental Status: He is alert.   Psychiatric:         Attention and Perception: " Attention normal.         Mood and Affect: Mood normal.         Speech: Speech normal.            Result Review :   The following data was reviewed by: Jeffrey Poon MD on 09/03/2021:  PROTEIN C ACTIVITY (03/11/2021 15:25)  CONV DRVVT (03/11/2021 15:25)  Lipid Panel (03/01/2021 10:27)                  Assessment and Plan    Diagnoses and all orders for this visit:    1. Mixed hyperlipidemia (Primary)  Assessment & Plan:  Currently tolerating medications well.  Continue current regimen.  Check lab and predicate medication change based on results    Orders:  -     Lipid Panel    2. Essential hypertension  Assessment & Plan:  Blood pressures doing okay now.  Continue on current regimen.    Orders:  -     Comprehensive Metabolic Panel    3. Cerebral venous sinus thrombosis    4. Colon cancer screening  Assessment & Plan:  He is overdue for screening colonoscopy will get that scheduled for him.  His brother in law works at endoscopy Center     Orders:  -     Ambulatory Referral For Screening Colonoscopy    5. Screening for prostate cancer  Assessment & Plan:  We will check lab    Orders:  -     PSA Screen      Follow Up   No follow-ups on file.  Patient was given instructions and counseling regarding his condition or for health maintenance advice. Please see specific information pulled into the AVS if appropriate.

## 2021-09-07 DIAGNOSIS — R97.20 ELEVATED PSA: Primary | ICD-10-CM

## 2021-09-07 RX ORDER — CIPROFLOXACIN 500 MG/1
500 TABLET, FILM COATED ORAL 2 TIMES DAILY
Qty: 28 TABLET | Refills: 0 | Status: SHIPPED | OUTPATIENT
Start: 2021-09-07 | End: 2021-09-21

## 2021-09-08 ENCOUNTER — TELEPHONE (OUTPATIENT)
Dept: FAMILY MEDICINE CLINIC | Age: 66
End: 2021-09-08

## 2021-09-08 NOTE — TELEPHONE ENCOUNTER
Caller: Dariusz Cameron III    Relationship: Self    Best call back number: [phone]    What is the medical concern/diagnosis: ELEVATED PROSTATE NUMBERS     What specialty or service is being requested: UROLOGIST    What is the provider, practice or medical service name: [facility] [place] EITHER DR. SARAH RAMOS OR DR. BRISA PEREYRA     What is the office location:[place]    What is the office phone number: UNKNOWN     Any additional details:

## 2021-09-09 DIAGNOSIS — R97.20 ELEVATED PSA: Primary | ICD-10-CM

## 2021-09-30 ENCOUNTER — TELEPHONE (OUTPATIENT)
Dept: FAMILY MEDICINE CLINIC | Age: 66
End: 2021-09-30

## 2021-09-30 NOTE — TELEPHONE ENCOUNTER
HUB TO READ    Patient states he thought  was going to put an order in for him to have labs done after the 14 days since he was put on antibiotics. Patient states he would rather not see Specialist if he does not have to. Please call patient and advise if we can put in the lab orders for him to have this done.

## 2021-09-30 NOTE — TELEPHONE ENCOUNTER
Caller: KELLI STRONG    Relationship: Emergency Contact    Best call back number: [phone]    What orders are you requesting (i.e. lab or imaging): LABS    In what timeframe would the patient need to come in: ASAP    Additional notes: PATIENTS WIFE STATES HE HAS AN APPOINTMENT ON 10/07/21 AND NEEDS LABS DONE BEFORE THEN.    PLEASE CALL WHEN ORDERS ARE PLACED

## 2021-10-04 ENCOUNTER — LAB (OUTPATIENT)
Dept: LAB | Facility: HOSPITAL | Age: 66
End: 2021-10-04

## 2021-10-04 DIAGNOSIS — R97.20 ELEVATED PSA: ICD-10-CM

## 2021-10-04 LAB — PSA SERPL-MCNC: 22.2 NG/ML (ref 0–4)

## 2021-10-04 PROCEDURE — 36415 COLL VENOUS BLD VENIPUNCTURE: CPT

## 2021-10-04 PROCEDURE — 84153 ASSAY OF PSA TOTAL: CPT

## 2022-02-21 RX ORDER — LISINOPRIL AND HYDROCHLOROTHIAZIDE 20; 12.5 MG/1; MG/1
TABLET ORAL
Qty: 90 TABLET | Refills: 0 | Status: SHIPPED | OUTPATIENT
Start: 2022-02-21 | End: 2022-09-02

## 2022-02-21 RX ORDER — SIMVASTATIN 20 MG
TABLET ORAL
Qty: 90 TABLET | Refills: 0 | Status: SHIPPED | OUTPATIENT
Start: 2022-02-21 | End: 2022-09-02

## 2022-03-04 ENCOUNTER — LAB (OUTPATIENT)
Dept: LAB | Facility: HOSPITAL | Age: 67
End: 2022-03-04

## 2022-03-04 ENCOUNTER — OFFICE VISIT (OUTPATIENT)
Dept: FAMILY MEDICINE CLINIC | Age: 67
End: 2022-03-04

## 2022-03-04 VITALS
HEIGHT: 73 IN | WEIGHT: 181 LBS | DIASTOLIC BLOOD PRESSURE: 79 MMHG | BODY MASS INDEX: 23.99 KG/M2 | OXYGEN SATURATION: 98 % | HEART RATE: 58 BPM | SYSTOLIC BLOOD PRESSURE: 126 MMHG

## 2022-03-04 DIAGNOSIS — Z86.16 HISTORY OF COVID-19: ICD-10-CM

## 2022-03-04 DIAGNOSIS — Z23 NEED FOR VACCINATION: ICD-10-CM

## 2022-03-04 DIAGNOSIS — E78.2 MIXED HYPERLIPIDEMIA: ICD-10-CM

## 2022-03-04 DIAGNOSIS — C61 PROSTATE CANCER: ICD-10-CM

## 2022-03-04 DIAGNOSIS — I10 PRIMARY HYPERTENSION: Primary | ICD-10-CM

## 2022-03-04 DIAGNOSIS — Z72.9 PROBLEM RELATED TO LIFESTYLE: ICD-10-CM

## 2022-03-04 DIAGNOSIS — G08 CEREBRAL VENOUS SINUS THROMBOSIS: ICD-10-CM

## 2022-03-04 DIAGNOSIS — Z12.11 COLON CANCER SCREENING: ICD-10-CM

## 2022-03-04 DIAGNOSIS — D64.9 ANEMIA, UNSPECIFIED TYPE: ICD-10-CM

## 2022-03-04 LAB
ALBUMIN SERPL-MCNC: 4.6 G/DL (ref 3.5–5.2)
ALBUMIN/GLOB SERPL: 1.8 G/DL
ALP SERPL-CCNC: 77 U/L (ref 39–117)
ALT SERPL W P-5'-P-CCNC: 12 U/L (ref 1–41)
ANION GAP SERPL CALCULATED.3IONS-SCNC: 10.3 MMOL/L (ref 5–15)
AST SERPL-CCNC: 14 U/L (ref 1–40)
BASOPHILS # BLD AUTO: 0.05 10*3/MM3 (ref 0–0.2)
BASOPHILS NFR BLD AUTO: 1 % (ref 0–1.5)
BILIRUB SERPL-MCNC: 0.6 MG/DL (ref 0–1.2)
BUN SERPL-MCNC: 21 MG/DL (ref 8–23)
BUN/CREAT SERPL: 18.6 (ref 7–25)
CALCIUM SPEC-SCNC: 9.9 MG/DL (ref 8.6–10.5)
CHLORIDE SERPL-SCNC: 101 MMOL/L (ref 98–107)
CHOLEST SERPL-MCNC: 203 MG/DL (ref 0–200)
CO2 SERPL-SCNC: 28.7 MMOL/L (ref 22–29)
CREAT SERPL-MCNC: 1.13 MG/DL (ref 0.76–1.27)
DEPRECATED RDW RBC AUTO: 48.4 FL (ref 37–54)
EGFRCR SERPLBLD CKD-EPI 2021: 72.6 ML/MIN/1.73
EOSINOPHIL # BLD AUTO: 0.21 10*3/MM3 (ref 0–0.4)
EOSINOPHIL NFR BLD AUTO: 4.1 % (ref 0.3–6.2)
ERYTHROCYTE [DISTWIDTH] IN BLOOD BY AUTOMATED COUNT: 16.1 % (ref 12.3–15.4)
GLOBULIN UR ELPH-MCNC: 2.6 GM/DL
GLUCOSE SERPL-MCNC: 116 MG/DL (ref 65–99)
HCT VFR BLD AUTO: 38.6 % (ref 37.5–51)
HCV AB SER DONR QL: NORMAL
HDLC SERPL-MCNC: 53 MG/DL (ref 40–60)
HGB BLD-MCNC: 11.8 G/DL (ref 13–17.7)
IMM GRANULOCYTES # BLD AUTO: 0.01 10*3/MM3 (ref 0–0.05)
IMM GRANULOCYTES NFR BLD AUTO: 0.2 % (ref 0–0.5)
LDLC SERPL CALC-MCNC: 133 MG/DL (ref 0–100)
LDLC/HDLC SERPL: 2.46 {RATIO}
LYMPHOCYTES # BLD AUTO: 1.46 10*3/MM3 (ref 0.7–3.1)
LYMPHOCYTES NFR BLD AUTO: 28.7 % (ref 19.6–45.3)
MCH RBC QN AUTO: 24.8 PG (ref 26.6–33)
MCHC RBC AUTO-ENTMCNC: 30.6 G/DL (ref 31.5–35.7)
MCV RBC AUTO: 81.3 FL (ref 79–97)
MONOCYTES # BLD AUTO: 0.54 10*3/MM3 (ref 0.1–0.9)
MONOCYTES NFR BLD AUTO: 10.6 % (ref 5–12)
NEUTROPHILS NFR BLD AUTO: 2.81 10*3/MM3 (ref 1.7–7)
NEUTROPHILS NFR BLD AUTO: 55.4 % (ref 42.7–76)
PLATELET # BLD AUTO: 331 10*3/MM3 (ref 140–450)
PMV BLD AUTO: 9.4 FL (ref 6–12)
POTASSIUM SERPL-SCNC: 4.2 MMOL/L (ref 3.5–5.2)
PROT SERPL-MCNC: 7.2 G/DL (ref 6–8.5)
RBC # BLD AUTO: 4.75 10*6/MM3 (ref 4.14–5.8)
SODIUM SERPL-SCNC: 140 MMOL/L (ref 136–145)
TRIGL SERPL-MCNC: 97 MG/DL (ref 0–150)
VLDLC SERPL-MCNC: 17 MG/DL (ref 5–40)
WBC NRBC COR # BLD: 5.08 10*3/MM3 (ref 3.4–10.8)

## 2022-03-04 PROCEDURE — 90686 IIV4 VACC NO PRSV 0.5 ML IM: CPT | Performed by: FAMILY MEDICINE

## 2022-03-04 PROCEDURE — 90471 IMMUNIZATION ADMIN: CPT | Performed by: FAMILY MEDICINE

## 2022-03-04 PROCEDURE — 80061 LIPID PANEL: CPT | Performed by: FAMILY MEDICINE

## 2022-03-04 PROCEDURE — 36415 COLL VENOUS BLD VENIPUNCTURE: CPT | Performed by: FAMILY MEDICINE

## 2022-03-04 PROCEDURE — 83540 ASSAY OF IRON: CPT | Performed by: FAMILY MEDICINE

## 2022-03-04 PROCEDURE — 80053 COMPREHEN METABOLIC PANEL: CPT | Performed by: FAMILY MEDICINE

## 2022-03-04 PROCEDURE — 84466 ASSAY OF TRANSFERRIN: CPT | Performed by: FAMILY MEDICINE

## 2022-03-04 PROCEDURE — 86803 HEPATITIS C AB TEST: CPT

## 2022-03-04 PROCEDURE — 99214 OFFICE O/P EST MOD 30 MIN: CPT | Performed by: FAMILY MEDICINE

## 2022-03-04 PROCEDURE — 85025 COMPLETE CBC W/AUTO DIFF WBC: CPT | Performed by: FAMILY MEDICINE

## 2022-03-04 RX ORDER — POLYETHYLENE GLYCOL 3350 17 G/17G
POWDER, FOR SOLUTION ORAL
COMMUNITY
Start: 2022-01-19 | End: 2022-09-07

## 2022-03-04 NOTE — PROGRESS NOTES
Chief Complaint  Hypertension (6 month ), Hyperlipidemia, and Cerebral venous sinus thrombosis    Subjective          Dariusz Cameron III presents to [facility]  History of Present Illness  MENDOZA  The patient has consented to being recorded using MENDOZA.    The patient is approximately 6 weeks status post prostatectomy. He is doing well overall, and notes improvement of his symptoms. He was going to obtain his surgery in 12/2021, but he tested positive for COVID-19 on 12/13/2021 and had to postpone his surgery until 01/18/2022.  The patient reports he is to follow-up with urology very 3 months for 3 years, and then 2 times a year up to I think 5 years for routine labs. He was advised his PSA goal is to be 0. HE has a consult with his radiation oncologist in 1 week.     The patient denies obtaining his influenza or COVID booster vaccines.     Services blood pressure is tolerating medication well.  No episodes of lightheadedness or dizziness.  As far as his hyperlipidemia likewise tolerates the medicine well.    The patient denies having any future follow-ups with neurology or his blood specialists. He is no longer on blood thinners.     Current Outpatient Medications   Medication Sig Dispense Refill   • lisinopril-hydrochlorothiazide (PRINZIDE,ZESTORETIC) 20-12.5 MG per tablet TAKE ONE TABLET BY MOUTH DAILY 90 tablet 0   • polyethylene glycol (MIRALAX) 17 g packet      • simvastatin (ZOCOR) 20 MG tablet TAKE ONE TABLET BY MOUTH DAILY 90 tablet 0     No current facility-administered medications for this visit.       Review of Systems   Constitutional: Negative for chills, fatigue and fever.   Respiratory: Negative for chest tightness, shortness of breath and wheezing.    Cardiovascular: Negative for chest pain and palpitations.   Gastrointestinal: Negative for constipation, diarrhea, nausea and vomiting.   Genitourinary: Negative for dysuria, hematuria and urgency.   Neurological: Negative  "for weakness and headache.   Psychiatric/Behavioral: Negative for hallucinations.            Objective   Vital Signs:   /79 (BP Location: Left arm, Patient Position: Sitting, Cuff Size: Adult)   Pulse 58   Ht 185.4 cm (73\")   Wt 82.1 kg (181 lb)   SpO2 98%   BMI 23.88 kg/m²     Physical Exam   Constitutional: Patient is in no acute distress.  Eyes: Nonicteric bilaterally.   Ears: Tympanic membranes are clear with normal ear canal.   Throat: Oral pharynx is clear.   Neck: Supple without palpable adenopathy. Thyroid is nonenlarged, nontender and no masses. No supraclavicular adenopathy.  Heart: Regular rate and rhythm without murmur, gallop or rub.  Lungs: Good air movement bilaterally, clear without crackles or wheeze.  Abdomen: Bowel sounds are positive. The abdomen is soft and nontender. No organomegaly or palpable mass.   Extremities: Without pedal edema.   Neurologic: No lateralizing focal neurologic deficit.   Psychiatric: Patient has normal mood, attention, and behavior with normal thought content.    Result Review :                     Assessment and Plan    Diagnoses and all orders for this visit:    1. Primary hypertension (Primary)  -     Comprehensive Metabolic Panel  -     CBC & Differential  -  We will continue on current regimen.    2. Mixed hyperlipidemia  -     Lipid Panel  -  Repeat labs, predict medication change based on results.    3. Prostate cancer (HCC)       -  Copied records that he provided today.       -  Continue to follow with urology.        -  He has an upcoming appointment with radiation oncology.    4. Colon cancer screening  Comments:  Says that Urologist recommend wait to get colonoscopy x 4 months to allow healing.     5. Problem related to lifestyle  -     Hepatitis C Antibody; Future    6. Need for vaccination  -     FluLaval/Fluarix/Fluzone >6 Months ([id number])    7. Cerebral venous sinus thrombosis  Comments:  No need for anticoagulation.    8. History of " COVID-19        Follow Up   No follow-ups on file.  Patient was given instructions and counseling regarding his condition or for health maintenance advice. Please see specific information pulled into the AVS if appropriate.     Transcribed from ambient dictation for Jeffrey Poon MD by Donato Keller.  03/04/22   10:11 EST    Patient verbalized consent to the visit recording.

## 2022-03-05 DIAGNOSIS — D64.9 ANEMIA, UNSPECIFIED TYPE: Primary | ICD-10-CM

## 2022-03-05 LAB
IRON 24H UR-MRATE: 39 MCG/DL (ref 59–158)
IRON SATN MFR SERPL: 9 % (ref 20–50)
TIBC SERPL-MCNC: 432 MCG/DL (ref 298–536)
TRANSFERRIN SERPL-MCNC: 290 MG/DL (ref 200–360)

## 2022-03-07 DIAGNOSIS — D50.9 IRON DEFICIENCY ANEMIA, UNSPECIFIED IRON DEFICIENCY ANEMIA TYPE: Primary | ICD-10-CM

## 2022-03-11 ENCOUNTER — LAB (OUTPATIENT)
Dept: LAB | Facility: HOSPITAL | Age: 67
End: 2022-03-11

## 2022-03-11 DIAGNOSIS — D50.9 IRON DEFICIENCY ANEMIA, UNSPECIFIED IRON DEFICIENCY ANEMIA TYPE: ICD-10-CM

## 2022-03-11 DIAGNOSIS — R73.09 ABNORMAL GLUCOSE: ICD-10-CM

## 2022-03-11 LAB — HEMOCCULT STL QL IA: NEGATIVE

## 2022-03-11 PROCEDURE — 82274 ASSAY TEST FOR BLOOD FECAL: CPT

## 2022-03-14 DIAGNOSIS — D50.9 IRON DEFICIENCY ANEMIA, UNSPECIFIED IRON DEFICIENCY ANEMIA TYPE: Primary | ICD-10-CM

## 2022-03-14 RX ORDER — FERROUS SULFATE 325(65) MG
325 TABLET ORAL
Qty: 100 TABLET | Refills: 1 | Status: SHIPPED | OUTPATIENT
Start: 2022-03-14 | End: 2022-09-07

## 2022-04-12 ENCOUNTER — TELEPHONE (OUTPATIENT)
Dept: FAMILY MEDICINE CLINIC | Age: 67
End: 2022-04-12

## 2022-04-21 ENCOUNTER — TELEPHONE (OUTPATIENT)
Dept: FAMILY MEDICINE CLINIC | Age: 67
End: 2022-04-21

## 2022-04-21 NOTE — TELEPHONE ENCOUNTER
Left detailed voice mail on pts PERSONALIZED voice mail informing him that dr webster wanted him to have some follow up lab work in 1 month. Stated on voice mail that lab order is in chart and pt can come in at his earliest convenience, lab opens at 7 am.- clr

## 2022-09-02 RX ORDER — SIMVASTATIN 20 MG
TABLET ORAL
Qty: 90 TABLET | Refills: 0 | Status: SHIPPED | OUTPATIENT
Start: 2022-09-02 | End: 2022-12-21 | Stop reason: SDUPTHER

## 2022-09-02 RX ORDER — LISINOPRIL AND HYDROCHLOROTHIAZIDE 20; 12.5 MG/1; MG/1
TABLET ORAL
Qty: 90 TABLET | Refills: 0 | Status: SHIPPED | OUTPATIENT
Start: 2022-09-02 | End: 2022-12-21 | Stop reason: SDUPTHER

## 2022-09-07 ENCOUNTER — OFFICE VISIT (OUTPATIENT)
Dept: FAMILY MEDICINE CLINIC | Age: 67
End: 2022-09-07

## 2022-09-07 ENCOUNTER — LAB (OUTPATIENT)
Dept: LAB | Facility: HOSPITAL | Age: 67
End: 2022-09-07

## 2022-09-07 VITALS
HEART RATE: 54 BPM | BODY MASS INDEX: 24.52 KG/M2 | WEIGHT: 185 LBS | HEIGHT: 73 IN | DIASTOLIC BLOOD PRESSURE: 61 MMHG | SYSTOLIC BLOOD PRESSURE: 108 MMHG | OXYGEN SATURATION: 100 %

## 2022-09-07 DIAGNOSIS — D64.9 ANEMIA, UNSPECIFIED TYPE: ICD-10-CM

## 2022-09-07 DIAGNOSIS — E78.2 MIXED HYPERLIPIDEMIA: Primary | ICD-10-CM

## 2022-09-07 DIAGNOSIS — D50.9 IRON DEFICIENCY ANEMIA, UNSPECIFIED IRON DEFICIENCY ANEMIA TYPE: ICD-10-CM

## 2022-09-07 DIAGNOSIS — I10 PRIMARY HYPERTENSION: ICD-10-CM

## 2022-09-07 DIAGNOSIS — Z12.11 COLON CANCER SCREENING: ICD-10-CM

## 2022-09-07 DIAGNOSIS — C61 PROSTATE CANCER: ICD-10-CM

## 2022-09-07 LAB
ALBUMIN SERPL-MCNC: 4.5 G/DL (ref 3.5–5.2)
ALBUMIN/GLOB SERPL: 1.7 G/DL
ALP SERPL-CCNC: 71 U/L (ref 39–117)
ALT SERPL W P-5'-P-CCNC: 13 U/L (ref 1–41)
ANION GAP SERPL CALCULATED.3IONS-SCNC: 9.1 MMOL/L (ref 5–15)
AST SERPL-CCNC: 19 U/L (ref 1–40)
BASOPHILS # BLD AUTO: 0.06 10*3/MM3 (ref 0–0.2)
BASOPHILS NFR BLD AUTO: 1.1 % (ref 0–1.5)
BILIRUB SERPL-MCNC: 0.8 MG/DL (ref 0–1.2)
BUN SERPL-MCNC: 15 MG/DL (ref 8–23)
BUN/CREAT SERPL: 13.2 (ref 7–25)
CALCIUM SPEC-SCNC: 10 MG/DL (ref 8.6–10.5)
CHLORIDE SERPL-SCNC: 100 MMOL/L (ref 98–107)
CHOLEST SERPL-MCNC: 199 MG/DL (ref 0–200)
CO2 SERPL-SCNC: 28.9 MMOL/L (ref 22–29)
CREAT SERPL-MCNC: 1.14 MG/DL (ref 0.76–1.27)
DEPRECATED RDW RBC AUTO: 47 FL (ref 37–54)
EGFRCR SERPLBLD CKD-EPI 2021: 71.8 ML/MIN/1.73
EOSINOPHIL # BLD AUTO: 0.14 10*3/MM3 (ref 0–0.4)
EOSINOPHIL NFR BLD AUTO: 2.5 % (ref 0.3–6.2)
ERYTHROCYTE [DISTWIDTH] IN BLOOD BY AUTOMATED COUNT: 15.5 % (ref 12.3–15.4)
FERRITIN SERPL-MCNC: 14.6 NG/ML (ref 30–400)
GLOBULIN UR ELPH-MCNC: 2.7 GM/DL
GLUCOSE SERPL-MCNC: 114 MG/DL (ref 65–99)
HCT VFR BLD AUTO: 40.5 % (ref 37.5–51)
HDLC SERPL-MCNC: 51 MG/DL (ref 40–60)
HGB BLD-MCNC: 12.4 G/DL (ref 13–17.7)
IMM GRANULOCYTES # BLD AUTO: 0.02 10*3/MM3 (ref 0–0.05)
IMM GRANULOCYTES NFR BLD AUTO: 0.4 % (ref 0–0.5)
IRON 24H UR-MRATE: 38 MCG/DL (ref 59–158)
IRON SATN MFR SERPL: 8 % (ref 20–50)
LDLC SERPL CALC-MCNC: 132 MG/DL (ref 0–100)
LDLC/HDLC SERPL: 2.55 {RATIO}
LYMPHOCYTES # BLD AUTO: 1.14 10*3/MM3 (ref 0.7–3.1)
LYMPHOCYTES NFR BLD AUTO: 20.7 % (ref 19.6–45.3)
MCH RBC QN AUTO: 25.1 PG (ref 26.6–33)
MCHC RBC AUTO-ENTMCNC: 30.6 G/DL (ref 31.5–35.7)
MCV RBC AUTO: 82 FL (ref 79–97)
MONOCYTES # BLD AUTO: 0.59 10*3/MM3 (ref 0.1–0.9)
MONOCYTES NFR BLD AUTO: 10.7 % (ref 5–12)
NEUTROPHILS NFR BLD AUTO: 3.55 10*3/MM3 (ref 1.7–7)
NEUTROPHILS NFR BLD AUTO: 64.6 % (ref 42.7–76)
PLATELET # BLD AUTO: 240 10*3/MM3 (ref 140–450)
PMV BLD AUTO: 8.5 FL (ref 6–12)
POTASSIUM SERPL-SCNC: 4.6 MMOL/L (ref 3.5–5.2)
PROT SERPL-MCNC: 7.2 G/DL (ref 6–8.5)
RBC # BLD AUTO: 4.94 10*6/MM3 (ref 4.14–5.8)
RETICS # AUTO: 0.06 10*6/MM3 (ref 0.02–0.13)
RETICS/RBC NFR AUTO: 1.21 % (ref 0.7–1.9)
SODIUM SERPL-SCNC: 138 MMOL/L (ref 136–145)
TIBC SERPL-MCNC: 481 MCG/DL (ref 298–536)
TRANSFERRIN SERPL-MCNC: 323 MG/DL (ref 200–360)
TRIGL SERPL-MCNC: 89 MG/DL (ref 0–150)
VLDLC SERPL-MCNC: 16 MG/DL (ref 5–40)
WBC NRBC COR # BLD: 5.5 10*3/MM3 (ref 3.4–10.8)

## 2022-09-07 PROCEDURE — 80061 LIPID PANEL: CPT | Performed by: FAMILY MEDICINE

## 2022-09-07 PROCEDURE — 84466 ASSAY OF TRANSFERRIN: CPT

## 2022-09-07 PROCEDURE — 85025 COMPLETE CBC W/AUTO DIFF WBC: CPT

## 2022-09-07 PROCEDURE — 83036 HEMOGLOBIN GLYCOSYLATED A1C: CPT

## 2022-09-07 PROCEDURE — 83540 ASSAY OF IRON: CPT

## 2022-09-07 PROCEDURE — 36415 COLL VENOUS BLD VENIPUNCTURE: CPT | Performed by: FAMILY MEDICINE

## 2022-09-07 PROCEDURE — 80053 COMPREHEN METABOLIC PANEL: CPT | Performed by: FAMILY MEDICINE

## 2022-09-07 PROCEDURE — 99214 OFFICE O/P EST MOD 30 MIN: CPT | Performed by: FAMILY MEDICINE

## 2022-09-07 PROCEDURE — 82728 ASSAY OF FERRITIN: CPT

## 2022-09-07 PROCEDURE — 85045 AUTOMATED RETICULOCYTE COUNT: CPT

## 2022-09-07 RX ORDER — SILDENAFIL CITRATE 20 MG/1
TABLET ORAL
COMMUNITY
Start: 2022-08-04

## 2022-09-07 NOTE — PROGRESS NOTES
"Chief Complaint  Hyperlipidemia (6 month follow up) and Hypertension    Subjective          Dariusz Cameron III presents to [facility]  History of Present Illness     Please refer to the student note from today as well, which was reviewed independently by me with the patient as well.     He has hypertension and  hyperlipidemia.  Tolerates his medicines well.  He does have some urinary dribbling related to his surgery.  Doubt his HCTZ is contributing.    No longer taking anticoagulants for the cerebral venous sinus thrombosis.    Had his prostate cancer surgery in January.  Which resulted in  delaying his screening colonoscopy. He has appt with GI later this fall.             Current Outpatient Medications   Medication Sig Dispense Refill   • lisinopril-hydrochlorothiazide (PRINZIDE,ZESTORETIC) 20-12.5 MG per tablet TAKE ONE TABLET BY MOUTH DAILY 90 tablet 0   • sildenafil (REVATIO) 20 MG tablet TAKE 1 TO 5 TABLETS BY MOUTH AS NEEDED     • simvastatin (ZOCOR) 20 MG tablet TAKE ONE TABLET BY MOUTH DAILY 90 tablet 0     No current facility-administered medications for this visit.       Review of Systems         Objective   Vital Signs:   /61 (BP Location: Left arm, Patient Position: Sitting, Cuff Size: Adult)   Pulse 54   Ht 185.4 cm (73\")   Wt 83.9 kg (185 lb)   SpO2 100%   BMI 24.41 kg/m²     Physical Exam  Constitutional:       Appearance: Normal appearance.   Neck:      Vascular: No carotid bruit.   Cardiovascular:      Rate and Rhythm: Normal rate and regular rhythm.      Heart sounds: Normal heart sounds. No murmur heard.  Pulmonary:      Effort: No respiratory distress.      Breath sounds: No wheezing or rales.   Musculoskeletal:      Right lower leg: No edema.      Left lower leg: No edema.   Lymphadenopathy:      Cervical: No cervical adenopathy.   Neurological:      General: No focal deficit present.      Mental Status: He is alert.   Psychiatric:         Attention " and Perception: Attention normal.         Mood and Affect: Mood normal.         Speech: Speech normal.            Result Review :                     Assessment and Plan    Diagnoses and all orders for this visit:    1. Mixed hyperlipidemia (Primary)  Assessment & Plan:  Check labs and predicate medication changes based on results    Orders:  -     Lipid Panel  -     Comprehensive Metabolic Panel    2. Primary hypertension  Assessment & Plan:  Blood pressures are doing good.  Continue on his current regimen    Orders:  -     Cancel: CBC & Differential  -     Comprehensive Metabolic Panel    3. Prostate cancer (HCC)  Assessment & Plan:  Overall improving from side effects of the surgery.  Continue to follow with urology.      4. Colon cancer screening  Assessment & Plan:  He is scheduling to have his colonoscopy later this fall.      5. Anemia, unspecified type  Comments:  He is taking a multivitamin iron.  Was not able to tolerate the iron by itself.  We will check labs and follow-up  Orders:  -     Cancel: Iron Profile      Follow Up   No follow-ups on file.  Patient was given instructions and counseling regarding his condition or for health maintenance advice. Please see specific information pulled into the AVS if appropriate.

## 2022-09-07 NOTE — PROGRESS NOTES
Mr. Cameron is a pleasant, well-groomed 66 year old male with PMH of HTN, hyperlipidemia, prostate cancer, and cerebral venous sinus thrombosis who presents today for follow up.     Subjective  Prostate cancer:  - At urology visit in July: PSA was 0.00, CT was clear  - Small seroma after surgery going away on its own  - Still some issues with peeing... can't have caffeine or carbonation or citrus cause incomplete voiding. First thing in the morning have to go right away.  Some urinary leakage. No blood in urine, no burning.   - Still working on erectile function. Slow improvements. Sildenafil helping.  - Fatigue was bad after surgery, improving.  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  - Next appointment with urology in October.    Cerebral veins:  - No recent follow ups  - Not on any blood thinners currently  - Neurology, hematology signed off    BP:  - Checks at home normally 110-120/ 60-70  - Happy with lisinopril, no current side effects    Cholesterol:  - Main diet change is cutting out certain drinks  - Cutting back on chocolate for prostate  - Takes simvastatin consistently, no current side effects.    No smoking, occasional alcohol use (couple times a month max)    Iron Deficiency:  Not taking iron any more.. too many side effects (change color of urine, constipation)  Takes daily multivitamin with iron    ROS:  Patient denies headaches, vision changes, hearing changes, nausea, vomiting, diarrhea, hematuria, dysuria, abdominal pain, arthralgias, myalgias, chest pain, difficulty swallowing  Patient endorses fatigue, erectile dysfunction, incomplete voiding, urinary urgency (All improving), and intolerance to carbonated and acidic drinks    Objective  Physical exam: Heart rate and rhythm wnl, lung sounds wnl  BP: 108/ 61    Assessment  Patient seems to be doing well. His blood pressure is well controlled, he is no longer being followed by hematology or neurology, and his prostate cancer seems to be  resolved. He is slowly but surely regaining normal urinary and sexual function.    Plan  Continue current medication regimen of lisinopril, simvastatin. Sildenafil, and daily multivitamin.   Blood work: lipid panel, iron level, CMP, CBC

## 2022-09-09 DIAGNOSIS — R73.09 ABNORMAL GLUCOSE: Primary | ICD-10-CM

## 2022-09-09 LAB — HBA1C MFR BLD: 6.2 % (ref 4.8–5.6)

## 2022-09-14 ENCOUNTER — TELEPHONE (OUTPATIENT)
Dept: FAMILY MEDICINE CLINIC | Age: 67
End: 2022-09-14

## 2022-09-14 NOTE — TELEPHONE ENCOUNTER
Caller: Dariusz Cameron III    Relationship: Self    Best call back number: [phone]      Additional details provided by patient:PATIENT STATES HE WAS ONLY GIVEN HIS IRON TEST RESULTS. PATIENT WANTS ALL HIS RESULTS FROM 9.7.22 ARE MAILED TO HIM AT HIS HOME ADDRESS ON FILE. PATIENT WOULD LIKE TO MAKE SURE HIS CHOLESTEROL AND GLUCOSE RESULTS ARE INCLUDED.

## 2022-12-21 ENCOUNTER — OFFICE VISIT (OUTPATIENT)
Dept: FAMILY MEDICINE CLINIC | Age: 67
End: 2022-12-21

## 2022-12-21 VITALS
OXYGEN SATURATION: 100 % | WEIGHT: 182.25 LBS | HEART RATE: 77 BPM | SYSTOLIC BLOOD PRESSURE: 101 MMHG | TEMPERATURE: 97.7 F | HEIGHT: 73 IN | DIASTOLIC BLOOD PRESSURE: 65 MMHG | BODY MASS INDEX: 24.15 KG/M2

## 2022-12-21 DIAGNOSIS — I10 PRIMARY HYPERTENSION: Primary | ICD-10-CM

## 2022-12-21 DIAGNOSIS — C61 PROSTATE CANCER: ICD-10-CM

## 2022-12-21 DIAGNOSIS — R53.83 FATIGUE, UNSPECIFIED TYPE: ICD-10-CM

## 2022-12-21 DIAGNOSIS — E78.2 MIXED HYPERLIPIDEMIA: ICD-10-CM

## 2022-12-21 DIAGNOSIS — R52 GENERALIZED BODY ACHES: ICD-10-CM

## 2022-12-21 DIAGNOSIS — U07.1 COVID-19 VIRUS INFECTION: ICD-10-CM

## 2022-12-21 LAB
EXPIRATION DATE: ABNORMAL
FLUAV AG UPPER RESP QL IA.RAPID: NOT DETECTED
FLUBV AG UPPER RESP QL IA.RAPID: NOT DETECTED
INTERNAL CONTROL: ABNORMAL
Lab: ABNORMAL
SARS-COV-2 AG UPPER RESP QL IA.RAPID: DETECTED

## 2022-12-21 PROCEDURE — 99214 OFFICE O/P EST MOD 30 MIN: CPT | Performed by: FAMILY MEDICINE

## 2022-12-21 PROCEDURE — 87428 SARSCOV & INF VIR A&B AG IA: CPT | Performed by: FAMILY MEDICINE

## 2022-12-21 RX ORDER — LISINOPRIL AND HYDROCHLOROTHIAZIDE 20; 12.5 MG/1; MG/1
1 TABLET ORAL DAILY
Qty: 90 TABLET | Refills: 0 | Status: SHIPPED | OUTPATIENT
Start: 2022-12-21

## 2022-12-21 RX ORDER — SIMVASTATIN 20 MG
20 TABLET ORAL DAILY
Qty: 90 TABLET | Refills: 0 | Status: SHIPPED | OUTPATIENT
Start: 2022-12-21

## 2022-12-21 RX ORDER — SIMVASTATIN 20 MG
TABLET ORAL
Qty: 90 TABLET | Refills: 0 | OUTPATIENT
Start: 2022-12-21

## 2022-12-21 NOTE — PROGRESS NOTES
"Chief Complaint  Generalized Body Aches (12/19/22), Fatigue, Chills, Hypertension, and Hyperlipidemia    Subjective          Dariusz Cameron III presents to [facility]  History of Present Illness    Patient here today with respiratory illness symptoms  Symptoms started about two days ago  Last night felt like swallowing glass  Feels like he probably had some chills but did not take his temperature.  Get together with kids earlier in the week for [place], but otherwise has no known exposures.      Also here for follow-up on hypertension hyperlipidemia.  He needs refills on his medications.  Tolerating the medicines well.  Reviewed his labs from September that looked okay.    His colonoscopy had gotten delayed due to his prostate cancer surgery.  Still has not completed that.  He is aware and intends to get scheduled next year with Dr. Stevens.          Current Outpatient Medications   Medication Sig Dispense Refill   • lisinopril-hydrochlorothiazide (PRINZIDE,ZESTORETIC) 20-12.5 MG per tablet Take 1 tablet by mouth Daily. 90 tablet 0   • sildenafil (REVATIO) 20 MG tablet TAKE 1 TO 5 TABLETS BY MOUTH AS NEEDED     • simvastatin (ZOCOR) 20 MG tablet Take 1 tablet by mouth Daily. 90 tablet 0   • Nirmatrelvir&Ritonavir 300/100 (PAXLOVID) 20 x 150 MG & 10 x 100MG tablet therapy pack tablet Take 3 tablets by mouth 2 (Two) Times a Day for 5 days. Hold the simvastatin while on Paxlovid and for 3 additional days 30 tablet 0     No current facility-administered medications for this visit.       Review of Systems         Objective   Vital Signs:   /65 (BP Location: Left arm, Patient Position: Sitting, Cuff Size: Adult)   Pulse 77   Temp 97.7 °F (36.5 °C)   Ht 185.4 cm (72.99\")   Wt 82.7 kg (182 lb 4 oz)   SpO2 100%   BMI 24.05 kg/m²     Physical Exam  Constitutional:       Appearance: Normal appearance.   HENT:      Right Ear: There is impacted cerumen.      Left Ear: There is " impacted cerumen.      Mouth/Throat:      Pharynx: No oropharyngeal exudate or posterior oropharyngeal erythema.   Eyes:      General: No scleral icterus.        Right eye: No discharge.         Left eye: No discharge.   Neck:      Vascular: No carotid bruit.   Cardiovascular:      Rate and Rhythm: Normal rate and regular rhythm.      Heart sounds: Normal heart sounds. No murmur heard.  Pulmonary:      Effort: No respiratory distress.      Breath sounds: No wheezing or rales.   Musculoskeletal:      Right lower leg: No edema.      Left lower leg: No edema.   Lymphadenopathy:      Cervical: No cervical adenopathy.   Neurological:      General: No focal deficit present.      Mental Status: He is alert.   Psychiatric:         Attention and Perception: Attention normal.         Mood and Affect: Mood normal.         Speech: Speech normal.            Result Review :                     Assessment and Plan    Diagnoses and all orders for this visit:    1. Primary hypertension (Primary)  Assessment & Plan:  Last lipid profile in September looked okay continue current regimen    Orders:  -     lisinopril-hydrochlorothiazide (PRINZIDE,ZESTORETIC) 20-12.5 MG per tablet; Take 1 tablet by mouth Daily.  Dispense: 90 tablet; Refill: 0    2. Mixed hyperlipidemia  Assessment & Plan:  Last lipid profile in September looked okay continue current regimen    Orders:  -     simvastatin (ZOCOR) 20 MG tablet; Take 1 tablet by mouth Daily.  Dispense: 90 tablet; Refill: 0    3. Prostate cancer (HCC)  Comments:  Continues to follow with urology reports so far look good    4. Fatigue, unspecified type  -     POCT SARS-CoV-2 Antigen YUAN + Flu    5. Generalized body aches  -     POCT SARS-CoV-2 Antigen YUAN + Flu    6. COVID-19 virus infection  Assessment & Plan:  CDC isolation recommendations discussed.  Ability of Paxlovid and its EUA status reviewed.  Patient desires to be treated.  Will hold simvastatin while on the medication and for 3  additional days.  Review of systems that would warrant urgent medical attention    Orders:  -     Nirmatrelvir&Ritonavir 300/100 (PAXLOVID) 20 x 150 MG & 10 x 100MG tablet therapy pack tablet; Take 3 tablets by mouth 2 (Two) Times a Day for 5 days. Hold the simvastatin while on Paxlovid and for 3 additional days  Dispense: 30 tablet; Refill: 0      Follow Up   No follow-ups on file.  Patient was given instructions and counseling regarding his condition or for health maintenance advice. Please see specific information pulled into the AVS if appropriate.

## 2022-12-21 NOTE — ASSESSMENT & PLAN NOTE
CDC isolation recommendations discussed.  Ability of Paxlovid and its EUA status reviewed.  Patient desires to be treated.  Will hold simvastatin while on the medication and for 3 additional days.  Review of systems that would warrant urgent medical attention

## 2023-02-15 ENCOUNTER — TELEPHONE (OUTPATIENT)
Dept: FAMILY MEDICINE CLINIC | Age: 68
End: 2023-02-15

## 2023-02-15 NOTE — TELEPHONE ENCOUNTER
Caller: GLORIA    Relationship: Other    Best call back number: [phone]    What form or medical record are you requesting: MEDICAL RECORD/OFFICE NOTE    Who is requesting this form or medical record from you: [facility]    How would you like to receive the form or medical records (pick-up, mail, fax): FAX  If fax, what is the fax number: [phone]      Timeframe paperwork needed: ASAP    Additional notes: GLORIA FROM THE [facility] STATED SHE NEEDS THE OFFICE NOTE/RECORD FROM 09/03/21 FAXED OVER ASAP

## 2023-03-03 ENCOUNTER — OFFICE VISIT (OUTPATIENT)
Dept: FAMILY MEDICINE CLINIC | Age: 68
End: 2023-03-03
Payer: COMMERCIAL

## 2023-03-03 ENCOUNTER — LAB (OUTPATIENT)
Dept: LAB | Facility: HOSPITAL | Age: 68
End: 2023-03-03
Payer: COMMERCIAL

## 2023-03-03 ENCOUNTER — HOSPITAL ENCOUNTER (OUTPATIENT)
Dept: GENERAL RADIOLOGY | Facility: HOSPITAL | Age: 68
Discharge: HOME | End: 2023-03-03
Payer: COMMERCIAL

## 2023-03-03 VITALS
TEMPERATURE: 97.6 F | OXYGEN SATURATION: 98 % | SYSTOLIC BLOOD PRESSURE: 136 MMHG | DIASTOLIC BLOOD PRESSURE: 89 MMHG | HEART RATE: 59 BPM | BODY MASS INDEX: 24.17 KG/M2 | HEIGHT: 73 IN | WEIGHT: 182.38 LBS

## 2023-03-03 DIAGNOSIS — M25.511 CHRONIC RIGHT SHOULDER PAIN: ICD-10-CM

## 2023-03-03 DIAGNOSIS — G89.29 CHRONIC RIGHT SHOULDER PAIN: ICD-10-CM

## 2023-03-03 DIAGNOSIS — M25.511 CHRONIC RIGHT SHOULDER PAIN: Primary | ICD-10-CM

## 2023-03-03 DIAGNOSIS — E78.2 MIXED HYPERLIPIDEMIA: ICD-10-CM

## 2023-03-03 DIAGNOSIS — I10 PRIMARY HYPERTENSION: ICD-10-CM

## 2023-03-03 DIAGNOSIS — G89.29 CHRONIC RIGHT SHOULDER PAIN: Primary | ICD-10-CM

## 2023-03-03 LAB
ALBUMIN SERPL-MCNC: 4.6 G/DL (ref 3.5–5.2)
ALBUMIN/GLOB SERPL: 1.7 G/DL
ALP SERPL-CCNC: 69 U/L (ref 39–117)
ALT SERPL W P-5'-P-CCNC: 12 U/L (ref 1–41)
ANION GAP SERPL CALCULATED.3IONS-SCNC: 9 MMOL/L (ref 5–15)
AST SERPL-CCNC: 16 U/L (ref 1–40)
BILIRUB SERPL-MCNC: 1 MG/DL (ref 0–1.2)
BUN SERPL-MCNC: 20 MG/DL (ref 8–23)
BUN/CREAT SERPL: 17.1 (ref 7–25)
CALCIUM SPEC-SCNC: 9.5 MG/DL (ref 8.6–10.5)
CHLORIDE SERPL-SCNC: 103 MMOL/L (ref 98–107)
CHOLEST SERPL-MCNC: 181 MG/DL (ref 0–200)
CO2 SERPL-SCNC: 27 MMOL/L (ref 22–29)
CREAT SERPL-MCNC: 1.17 MG/DL (ref 0.76–1.27)
EGFRCR SERPLBLD CKD-EPI 2021: 69.2 ML/MIN/1.73
GLOBULIN UR ELPH-MCNC: 2.7 GM/DL
GLUCOSE SERPL-MCNC: 108 MG/DL (ref 65–99)
HDLC SERPL-MCNC: 58 MG/DL (ref 40–60)
LDLC SERPL CALC-MCNC: 108 MG/DL (ref 0–100)
LDLC/HDLC SERPL: 1.84 {RATIO}
POTASSIUM SERPL-SCNC: 4.5 MMOL/L (ref 3.5–5.2)
PROT SERPL-MCNC: 7.3 G/DL (ref 6–8.5)
SODIUM SERPL-SCNC: 139 MMOL/L (ref 136–145)
TRIGL SERPL-MCNC: 82 MG/DL (ref 0–150)
VLDLC SERPL-MCNC: 15 MG/DL (ref 5–40)

## 2023-03-03 PROCEDURE — 99214 OFFICE O/P EST MOD 30 MIN: CPT | Performed by: FAMILY MEDICINE

## 2023-03-03 PROCEDURE — 73030 X-RAY EXAM OF SHOULDER: CPT

## 2023-03-03 PROCEDURE — 80061 LIPID PANEL: CPT | Performed by: FAMILY MEDICINE

## 2023-03-03 PROCEDURE — 36415 COLL VENOUS BLD VENIPUNCTURE: CPT | Performed by: FAMILY MEDICINE

## 2023-03-03 PROCEDURE — 80053 COMPREHEN METABOLIC PANEL: CPT | Performed by: FAMILY MEDICINE

## 2023-03-03 NOTE — ASSESSMENT & PLAN NOTE
Blood pressure is borderline but acceptable specially in light of his history of better blood pressures out of the office.  Continue on current regimen

## 2023-03-03 NOTE — PROGRESS NOTES
"Chief Complaint  Hypertension (6 month follow up), Hyperlipidemia, and Prostate Cancer    Subjective          Dariusz Cameron III presents to [facility]  History of Present Illness    Reports that in the past month or 2 noted onset of some right shoulder pain.  In the past couple weeks has been more noticeable.  He still able to paint okay.  However he knows if he is in the car and reaches back with his right arm tends to cause considerable discomfort.  Also at nighttime and he lies on the right side it seems painful.  Also notices pain when he goes to put on his jacket.    He saw the urologist on March 1 and got good report.  His PSA was 0.  Having occasional dribbling.  No significant PVR.    His blood pressure at the urology office was 130/83.  Checks his blood pressure at home occasionally generally runs better than that.  Pressure today is borderline.  He is tolerating his medication okay.    He did get his colonoscopy scheduled with Dr. Toledo.    Current Outpatient Medications   Medication Sig Dispense Refill   • lisinopril-hydrochlorothiazide (PRINZIDE,ZESTORETIC) 20-12.5 MG per tablet Take 1 tablet by mouth Daily. 90 tablet 0   • sildenafil (REVATIO) 20 MG tablet TAKE 1 TO 5 TABLETS BY MOUTH AS NEEDED     • simvastatin (ZOCOR) 20 MG tablet Take 1 tablet by mouth Daily. 90 tablet 0     No current facility-administered medications for this visit.       Review of Systems         Objective   Vital Signs:   /89 (BP Location: Left arm, Patient Position: Sitting, Cuff Size: Adult)   Pulse 59   Temp 97.6 °F (36.4 °C)   Ht 185.4 cm (72.99\")   Wt 82.7 kg (182 lb 6 oz)   SpO2 98%   BMI 24.07 kg/m²     Physical Exam   No distress  Color is good  No labored breathing  Tympanic membrane's clear  Oropharynx clear  Heart regular rate and rhythm no murmur   Lungs are bilaterally clear  Right shoulder has some tenderness over the AC joint.  Has difficulty with abduction worse with " pronation and supination but still pretty tender.  Not able to internally rotate enough to place the dorsum of his hand on his back.        Result Review :                     Assessment and Plan    Diagnoses and all orders for this visit:    1. Chronic right shoulder pain (Primary)  Assessment & Plan:  We will start evaluation with a x-ray of the shoulder.    Orders:  -     XR Shoulder 2+ View Right; Future    2. Primary hypertension  Assessment & Plan:  Blood pressure is borderline but acceptable specially in light of his history of better blood pressures out of the office.  Continue on current regimen    Orders:  -     Comprehensive Metabolic Panel    3. Mixed hyperlipidemia  Assessment & Plan:  We will check lab and predicate medication change based on result    Orders:  -     Lipid Panel  -     Comprehensive Metabolic Panel      Follow Up   No follow-ups on file.  Patient was given instructions and counseling regarding his condition or for health maintenance advice. Please see specific information pulled into the AVS if appropriate.

## 2023-03-17 ENCOUNTER — OFFICE VISIT (OUTPATIENT)
Dept: FAMILY MEDICINE CLINIC | Age: 68
End: 2023-03-17
Payer: COMMERCIAL

## 2023-03-17 VITALS
BODY MASS INDEX: 24.36 KG/M2 | OXYGEN SATURATION: 100 % | SYSTOLIC BLOOD PRESSURE: 121 MMHG | WEIGHT: 183.8 LBS | HEIGHT: 73 IN | DIASTOLIC BLOOD PRESSURE: 71 MMHG | HEART RATE: 60 BPM

## 2023-03-17 DIAGNOSIS — M25.511 PAIN IN JOINT OF RIGHT SHOULDER: ICD-10-CM

## 2023-03-17 DIAGNOSIS — M12.9 ARTHROPATHY, UNSPECIFIED: Primary | ICD-10-CM

## 2023-03-17 PROBLEM — M25.519 PAIN IN JOINT, SHOULDER REGION: Status: ACTIVE | Noted: 2023-03-17

## 2023-03-17 NOTE — ASSESSMENT & PLAN NOTE
The joint space is identified point of maximum tenderness marked.  After informed consent occluding risks of infection, bleeding, atrophy of tissues he is willing to proceed.  Sterile prep with Betadine.  Joint is injected with 1/2 cc lidocaine without epine[place] [address] expires 2025 and 1/2 cc triamcinolone [id number]  2024.  Patient tolerated procedure well.  Following procedure he already felt relief from symptoms.

## 2023-03-17 NOTE — PROGRESS NOTES
"Chief Complaint  Procedure ((Rt) Shoulder injection )    Subjective          Dariusz Cameron III presents to [facility]  History of Present Illness    Here today for injection in the right AC joint  Asked very good questions regarding the procedure.  It was obvious he had done some Dr. Worthy research.  He is willing to proceed with the procedure.    Current Outpatient Medications   Medication Sig Dispense Refill   • lisinopril-hydrochlorothiazide (PRINZIDE,ZESTORETIC) 20-12.5 MG per tablet Take 1 tablet by mouth Daily. 90 tablet 0   • sildenafil (REVATIO) 20 MG tablet TAKE 1 TO 5 TABLETS BY MOUTH AS NEEDED     • simvastatin (ZOCOR) 20 MG tablet Take 1 tablet by mouth Daily. 90 tablet 0     No current facility-administered medications for this visit.       Review of Systems         Objective   Vital Signs:   /71 (BP Location: Left arm, Patient Position: Sitting, Cuff Size: Adult)   Pulse 60   Ht 185.4 cm (72.99\")   Wt 83.4 kg (183 lb 12.8 oz)   SpO2 100% Comment: room air  BMI 24.26 kg/m²     Physical Exam     He is point tender over the right AC joint    Result Review :                     Assessment and Plan    Diagnoses and all orders for this visit:    1. Arthropathy, unspecified (Primary)  Assessment & Plan:  The joint space is identified point of maximum tenderness marked.  After informed consent occluding risks of infection, bleeding, atrophy of tissues he is willing to proceed.  Sterile prep with Betadine.  Joint is injected with 1/2 cc lidocaine without [place] [address] expires 2025 and 1/2 cc triamcinolone [id number]  2024.  Patient tolerated procedure well.  Following procedure he already felt relief from symptoms.      2. Pain in joint of right shoulder  Assessment & Plan:  As above        Follow Up   No follow-ups on file.  Patient was given instructions and counseling regarding his condition or for health maintenance advice. Please see " specific information pulled into the AVS if appropriate.

## 2023-04-27 DIAGNOSIS — E78.2 MIXED HYPERLIPIDEMIA: ICD-10-CM

## 2023-04-27 DIAGNOSIS — I10 PRIMARY HYPERTENSION: ICD-10-CM

## 2023-04-28 RX ORDER — LISINOPRIL AND HYDROCHLOROTHIAZIDE 20; 12.5 MG/1; MG/1
TABLET ORAL
Qty: 90 TABLET | Refills: 1 | Status: SHIPPED | OUTPATIENT
Start: 2023-04-28

## 2023-04-28 RX ORDER — SIMVASTATIN 20 MG
TABLET ORAL
Qty: 90 TABLET | Refills: 1 | Status: SHIPPED | OUTPATIENT
Start: 2023-04-28

## 2023-09-15 ENCOUNTER — LAB (OUTPATIENT)
Dept: LAB | Facility: HOSPITAL | Age: 68
End: 2023-09-15
Payer: COMMERCIAL

## 2023-09-15 ENCOUNTER — OFFICE VISIT (OUTPATIENT)
Dept: FAMILY MEDICINE CLINIC | Age: 68
End: 2023-09-15
Payer: COMMERCIAL

## 2023-09-15 VITALS
OXYGEN SATURATION: 98 % | WEIGHT: 183 LBS | DIASTOLIC BLOOD PRESSURE: 82 MMHG | SYSTOLIC BLOOD PRESSURE: 127 MMHG | HEART RATE: 59 BPM | HEIGHT: 73 IN | BODY MASS INDEX: 24.25 KG/M2

## 2023-09-15 DIAGNOSIS — E78.2 MIXED HYPERLIPIDEMIA: ICD-10-CM

## 2023-09-15 DIAGNOSIS — Z23 NEED FOR INFLUENZA VACCINATION: ICD-10-CM

## 2023-09-15 DIAGNOSIS — Z23 NEED FOR VACCINATION: ICD-10-CM

## 2023-09-15 DIAGNOSIS — R21 RASH: ICD-10-CM

## 2023-09-15 DIAGNOSIS — I10 PRIMARY HYPERTENSION: Primary | ICD-10-CM

## 2023-09-15 DIAGNOSIS — C61 PROSTATE CANCER: ICD-10-CM

## 2023-09-15 PROBLEM — Z00.00 MEDICARE WELCOME VISIT: Status: ACTIVE | Noted: 2023-09-15

## 2023-09-15 LAB
ALBUMIN SERPL-MCNC: 5 G/DL (ref 3.5–5.2)
ALBUMIN/GLOB SERPL: 2.5 G/DL
ALP SERPL-CCNC: 66 U/L (ref 39–117)
ALT SERPL W P-5'-P-CCNC: 12 U/L (ref 1–41)
ANION GAP SERPL CALCULATED.3IONS-SCNC: 12 MMOL/L (ref 5–15)
AST SERPL-CCNC: 23 U/L (ref 1–40)
BILIRUB SERPL-MCNC: 1 MG/DL (ref 0–1.2)
BUN SERPL-MCNC: 17 MG/DL (ref 8–23)
BUN/CREAT SERPL: 15.7 (ref 7–25)
CALCIUM SPEC-SCNC: 9.7 MG/DL (ref 8.6–10.5)
CHLORIDE SERPL-SCNC: 102 MMOL/L (ref 98–107)
CHOLEST SERPL-MCNC: 151 MG/DL (ref 0–200)
CO2 SERPL-SCNC: 27 MMOL/L (ref 22–29)
CREAT SERPL-MCNC: 1.08 MG/DL (ref 0.76–1.27)
EGFRCR SERPLBLD CKD-EPI 2021: 76.2 ML/MIN/1.73
GLOBULIN UR ELPH-MCNC: 2 GM/DL
GLUCOSE SERPL-MCNC: 110 MG/DL (ref 65–99)
HDLC SERPL-MCNC: 57 MG/DL (ref 40–60)
LDLC SERPL CALC-MCNC: 80 MG/DL (ref 0–100)
LDLC/HDLC SERPL: 1.39 {RATIO}
POTASSIUM SERPL-SCNC: 4.8 MMOL/L (ref 3.5–5.2)
PROT SERPL-MCNC: 7 G/DL (ref 6–8.5)
SODIUM SERPL-SCNC: 141 MMOL/L (ref 136–145)
TRIGL SERPL-MCNC: 73 MG/DL (ref 0–150)
VLDLC SERPL-MCNC: 14 MG/DL (ref 5–40)

## 2023-09-15 PROCEDURE — 80061 LIPID PANEL: CPT | Performed by: FAMILY MEDICINE

## 2023-09-15 PROCEDURE — 80053 COMPREHEN METABOLIC PANEL: CPT | Performed by: FAMILY MEDICINE

## 2023-09-15 PROCEDURE — 36415 COLL VENOUS BLD VENIPUNCTURE: CPT | Performed by: FAMILY MEDICINE

## 2023-09-15 NOTE — PROGRESS NOTES
"Chief Complaint  Annual Exam (Physical /Wants to discuss new covid booster )    Subjective     {Problem List  Visit Diagnosis   Encounters  Notes  Medications  Labs  Result Review Imaging  Media :23}     Dariusz Cameron III presents to [facility]  History of Present Illness    Dariusz says that he is done well following the injection into the right AC joint.  Still taking a little bit easy.    His blood pressure looks great today.  Reports he has been doing little more exercise.  He is walking and doing some jogging.  About 3 miles a day.      Current Outpatient Medications   Medication Sig Dispense Refill    lisinopril-hydrochlorothiazide (PRINZIDE,ZESTORETIC) 20-12.5 MG per tablet TAKE ONE TABLET BY MOUTH DAILY 90 tablet 1    sildenafil (REVATIO) 20 MG tablet TAKE 1 TO 5 TABLETS BY MOUTH AS NEEDED      simvastatin (ZOCOR) 20 MG tablet TAKE ONE TABLET BY MOUTH DAILY 90 tablet 1     No current facility-administered medications for this visit.       Review of Systems         Objective   Vital Signs:   /82 (BP Location: Left arm, Patient Position: Sitting, Cuff Size: Adult)   Pulse 59   Ht 185.4 cm (72.99\")   Wt 83 kg (183 lb)   SpO2 98%   BMI 24.15 kg/m²     Physical Exam       Result Review :{Labs  Result Review  Imaging  Med Tab  Media  Procedures :23}   {The following data was reviewed by (Optional):38767}      {Ambulatory Labs (Optional):90258}  {Data reviewed (Optional):38988:::1}          Assessment and Plan {CC Problem List  Visit Diagnosis   ROS  Review (Popup)  Health Maintenance  Quality  BestPractice  Medications  SmartSets  SnapShot Encounters  Media :23}   Diagnoses and all orders for this visit:    1. Need for influenza vaccination (Primary)  -     Fluzone High-Dose 65+yrs (0602-4909)      {Time Spent (Optional):15077}  Follow Up {Instructions Charge Capture  Follow-up Communications :23}  No follow-ups on file.  Patient was given " instructions and counseling regarding his condition or for health maintenance advice. Please see specific information pulled into the AVS if appropriate.

## 2023-09-15 NOTE — PROGRESS NOTES
The ABCs of the Annual Wellness Visit  [facility]    Subjective     Dariusz Cameron III is a 67 y.o. male who presents for a  Welcome to Medicare Visit.    The following portions of the patient's history were reviewed and   updated as appropriate: allergies, current medications, past family history, past medical history, past social history, past surgical history, and problem list.     Compared to one year ago, the patient feels his physical   health is the same.    Compared to one year ago, the patient feels his mental   health is the same.    Recent Hospitalizations:  He was not admitted to the hospital during the last year.       Current Medical Providers:  Patient Care Team:  Jeffrey Poon MD as PCP - General (Family Medicine)    Outpatient Medications Prior to Visit   Medication Sig Dispense Refill    lisinopril-hydrochlorothiazide (PRINZIDE,ZESTORETIC) 20-12.5 MG per tablet TAKE ONE TABLET BY MOUTH DAILY 90 tablet 1    sildenafil (REVATIO) 20 MG tablet TAKE 1 TO 5 TABLETS BY MOUTH AS NEEDED      simvastatin (ZOCOR) 20 MG tablet TAKE ONE TABLET BY MOUTH DAILY 90 tablet 1     No facility-administered medications prior to visit.       No opioid medication identified on active medication list. I have reviewed chart for other potential  high risk medication/s and harmful drug interactions in the elderly.        Aspirin is not on active medication list.  Aspirin use is not indicated based on review of current medical condition/s. Risk of harm outweighs potential benefits.  .    Patient Active Problem List   Diagnosis    HLD (hyperlipidemia)    HTN (hypertension)    Cerebral venous sinus thrombosis    Colon cancer screening    Screening for prostate cancer    Prostate cancer    History of COVID-19    COVID-19 virus infection    Chronic right shoulder pain    Arthropathy, unspecified    Pain in joint, shoulder region    Medicare welcome visit    Rash     Advance Care Planning   Advance Care  "Planning     Advance Directive is not on file.  ACP discussion was held with the patient during this visit. Patient has an advance directive (not in EMR), copy requested.       Objective   Vitals:    09/15/23 0850   BP: 127/82   BP Location: Left arm   Patient Position: Sitting   Cuff Size: Adult   Pulse: 59   SpO2: 98%   Weight: 83 kg (183 lb)   Height: 185.4 cm (72.99\")     Estimated body mass index is 24.15 kg/m² as calculated from the following:    Height as of this encounter: 185.4 cm (72.99\").    Weight as of this encounter: 83 kg (183 lb).    BMI is within normal parameters. No other follow-up for BMI required.      Does the patient have evidence of cognitive impairment?   No    Lab Results   Component Value Date    TRIG 73 09/15/2023    HDL 57 09/15/2023    LDL 80 09/15/2023    VLDL 14 09/15/2023       Procedures       HEALTH RISK ASSESSMENT    Smoking Status:  Social History     Tobacco Use   Smoking Status Former    Packs/day: 0.25    Years: 3.00    Pack years: 0.75    Types: Cigarettes    Quit date:     Years since quittin.7   Smokeless Tobacco Never   Tobacco Comments    QUIT DATE LATE ; \"VERY SELDOM\"     Alcohol Consumption:  Social History     Substance and Sexual Activity   Alcohol Use Yes    Comment: SELDOM       Fall Risk Screen:    SHILO Fall Risk Assessment was completed, and patient is at LOW risk for falls.Assessment completed on:9/15/2023    Depression Screen:       9/15/2023     9:42 AM   PHQ-2/PHQ-9 Depression Screening   Little Interest or Pleasure in Doing Things 0-->not at all   Feeling Down, Depressed or Hopeless 0-->not at all   PHQ-9: Brief Depression Severity Measure Score 0       Health Habits and Functional and Cognitive Screenin/15/2023     9:40 AM   Functional & Cognitive Status   Do you have difficulty preparing food and eating? No   Do you have difficulty bathing yourself, getting dressed or grooming yourself? No   Do you have difficulty using the toilet? " No   Do you have difficulty moving around from place to place? No   Do you have trouble with steps or getting out of a bed or a chair? No   Current Diet Well Balanced Diet   Dental Exam Up to date        Dental Exam Comment Dr Sheppard   Eye Exam Not up to date   Exercise (times per week) 6 times per week   Current Exercises Include Light Weights;Walking   Do you need help using the phone?  No   Are you deaf or do you have serious difficulty hearing?  No   Do you need help to go to places out of walking distance? No   Do you need help shopping? No   Do you need help preparing meals?  No   Do you need help with housework?  No   Do you need help with laundry? No   Do you need help taking your medications? No   Do you need help managing money? No   Do you ever drive or ride in a car without wearing a seat belt? No   Have you felt unusual stress, anger or loneliness in the last month? No   Who do you live with? Spouse   If you need help, do you have trouble finding someone available to you? No   Have you been bothered in the last four weeks by sexual problems? No   Do you have difficulty concentrating, remembering or making decisions? No       Visual Acuity:    Vision Screening    Right eye Left eye Both eyes   Without correction 20/20 20/20 20/20   With correction      Comments: Welcome to Medicare eye screening   Age-appropriate Screening Schedule:  Refer to the list below for future screening recommendations based on patient's age, sex and/or medical conditions. Orders for these recommended tests are listed in the plan section. The patient has been provided with a written plan.    Health Maintenance   Topic Date Due    TDAP/TD VACCINES (1 - Tdap) Never done    ZOSTER VACCINE (1 of 2) Never done    COVID-19 Vaccine (4 - Pfizer series) 05/24/2021    ANNUAL PHYSICAL  Never done    AAA SCREEN (ONE-TIME)  Never done    COLORECTAL CANCER SCREENING  03/11/2023    INFLUENZA VACCINE  10/01/2023    LIPID PANEL  09/15/2024     "HEPATITIS C SCREENING  Completed    Pneumococcal Vaccine 65+  Completed        CMS Preventative Services Quick Reference  Risk Factors Identified During Encounter    None Identified  The above risks/problems have been discussed with the patient.  Pertinent information has been shared with the patient in the After Visit Summary.    Follow Up:   Initial Medicare Visit in one year    An After Visit Summary and PPPS were made available to the patient.      Additional E&M Note during same encounter follows:  Patient has multiple medical problems which are significant and separately identifiable that require additional work above and beyond the Medicare Wellness Visit.      Chief Complaint  Annual Exam (Physical /Wants to discuss new covid booster )    Subjective        HPI  Dariusz Cameron III is also being seen today for other health issues as noted below    Dariusz says that he is done well following the injection into the right AC joint.  Still taking a little bit easy.    His blood pressure looks great today.  Reports he has been doing little more exercise.  He is walking and doing some jogging.  About 3 miles a day.           Objective   Vital Signs:  /82 (BP Location: Left arm, Patient Position: Sitting, Cuff Size: Adult)   Pulse 59   Ht 185.4 cm (72.99\")   Wt 83 kg (183 lb)   SpO2 98%   BMI 24.15 kg/m²     Physical Exam  Vitals and nursing note reviewed.   Constitutional:       General: He is not in acute distress.     Appearance: Normal appearance.   HENT:      Right Ear: Tympanic membrane and ear canal normal.      Left Ear: Tympanic membrane and ear canal normal.      Mouth/Throat:      Mouth: Mucous membranes are moist.      Pharynx: Oropharynx is clear. No oropharyngeal exudate.   Eyes:      General: No scleral icterus.     Conjunctiva/sclera: Conjunctivae normal.   Neck:      Vascular: No carotid bruit.   Cardiovascular:      Rate and Rhythm: Normal rate and regular rhythm.      Heart sounds: No murmur " heard.    No friction rub. No gallop.   Pulmonary:      Effort: No respiratory distress.      Breath sounds: No wheezing or rales.   Abdominal:      General: Bowel sounds are normal.      Palpations: Abdomen is soft. There is no mass.      Tenderness: There is no abdominal tenderness. There is no guarding or rebound.   Musculoskeletal:         General: No swelling.      Right lower leg: No edema.      Left lower leg: No edema.   Lymphadenopathy:      Cervical: No cervical adenopathy.   Skin:     Coloration: Skin is not jaundiced.      Findings: No lesion.      Comments: Has a reticular erythematous discoloration on the upper back (see photo).  He was not aware of it and it does not bother him at all.   Neurological:      General: No focal deficit present.      Mental Status: He is alert and oriented to person, place, and time.   Psychiatric:         Mood and Affect: Mood normal.         Behavior: Behavior normal.         Thought Content: Thought content normal.         Judgment: Judgment normal.                    Assessment and Plan   Diagnoses and all orders for this visit:    1. Primary hypertension (Primary)  Assessment & Plan:  His blood pressure looks great continue on current regimen    Orders:  -     Comprehensive Metabolic Panel    2. Mixed hyperlipidemia  Assessment & Plan:  We will check lab predicate medication change based on result    Orders:  -     Comprehensive Metabolic Panel  -     Lipid Panel    3. Prostate cancer  Assessment & Plan:  He continues to follow with urology every 3 months.  Has upcoming appointment in the near future.  His last PSA was 0.04.  Continue to follow with urology as recommended      4. Need for vaccination  -     Pneumococcal Conjugate Vaccine 20-Valent (PCV20)    5. Need for influenza vaccination  -     Fluzone High-Dose 65+yrs (3127-3341)    6. Rash  Assessment & Plan:  Not sure what to make of his rash (see photo).  Is asymptomatic.  We will follow for now.      Other  orders  -     Cancel: ECG 12 Lead             Follow Up   No follow-ups on file.  Patient was given instructions and counseling regarding his condition or for health maintenance advice. Please see specific information pulled into the AVS if appropriate.

## 2023-09-15 NOTE — ASSESSMENT & PLAN NOTE
He continues to follow with urology every 3 months.  Has upcoming appointment in the near future.  His last PSA was 0.04.  Continue to follow with urology as recommended

## 2023-09-16 PROBLEM — R21 RASH: Status: ACTIVE | Noted: 2023-09-16

## 2024-01-12 DIAGNOSIS — I10 PRIMARY HYPERTENSION: ICD-10-CM

## 2024-01-12 DIAGNOSIS — E78.2 MIXED HYPERLIPIDEMIA: ICD-10-CM

## 2024-01-15 RX ORDER — SIMVASTATIN 20 MG
TABLET ORAL
Qty: 90 TABLET | Refills: 1 | Status: SHIPPED | OUTPATIENT
Start: 2024-01-15

## 2024-01-15 RX ORDER — LISINOPRIL AND HYDROCHLOROTHIAZIDE 20; 12.5 MG/1; MG/1
TABLET ORAL
Qty: 90 TABLET | Refills: 1 | Status: SHIPPED | OUTPATIENT
Start: 2024-01-15

## 2024-03-08 ENCOUNTER — OFFICE VISIT (OUTPATIENT)
Dept: FAMILY MEDICINE CLINIC | Age: 69
End: 2024-03-08
Payer: COMMERCIAL

## 2024-03-08 VITALS
WEIGHT: 184.2 LBS | DIASTOLIC BLOOD PRESSURE: 66 MMHG | BODY MASS INDEX: 24.41 KG/M2 | TEMPERATURE: 98.8 F | SYSTOLIC BLOOD PRESSURE: 121 MMHG | HEIGHT: 73 IN | HEART RATE: 64 BPM

## 2024-03-08 DIAGNOSIS — I10 PRIMARY HYPERTENSION: Primary | ICD-10-CM

## 2024-03-08 DIAGNOSIS — C61 PROSTATE CANCER: ICD-10-CM

## 2024-03-08 DIAGNOSIS — R73.03 PREDIABETES: ICD-10-CM

## 2024-03-08 DIAGNOSIS — E78.2 MIXED HYPERLIPIDEMIA: ICD-10-CM

## 2024-03-08 DIAGNOSIS — R91.1 LUNG NODULE: ICD-10-CM

## 2024-03-08 LAB
EXPIRATION DATE: ABNORMAL
HBA1C MFR BLD: 5.8 % (ref 4.5–5.7)
Lab: ABNORMAL

## 2024-03-08 NOTE — PROGRESS NOTES
"Chief Complaint  Hyperlipidemia and Hypertension    Subjective          Dariusz Cameron III presents to [facility]  History of Present Illness    Reports that he has had gradually increasing PSA levels.  As result they have initiated external beam radiation treatments and he has taken 7 out of total 40 proposed treatments.  Has caused him to be fatigued but otherwise is tolerating fairly well.    Has hypertension and hyperlipidemia.  Tolerating medications okay.    Current Outpatient Medications on File Prior to Visit   Medication Sig Dispense Refill    lisinopril-hydrochlorothiazide (PRINZIDE,ZESTORETIC) 20-12.5 MG per tablet TAKE ONE TABLET BY MOUTH DAILY 90 tablet 1    sildenafil (REVATIO) 20 MG tablet TAKE 1 TO 5 TABLETS BY MOUTH AS NEEDED      simvastatin (ZOCOR) 20 MG tablet TAKE ONE TABLET BY MOUTH DAILY 90 tablet 1     No current facility-administered medications on file prior to visit.       Review of Systems         Objective   Vital Signs:   /66 (BP Location: Right arm, Patient Position: Sitting)   Pulse 64   Temp 98.8 °F (37.1 °C) (Temporal)   Ht 185.4 cm (72.99\")   Wt 83.6 kg (184 lb 3.2 oz)   BMI 24.31 kg/m²     Physical Exam  Constitutional:       Appearance: Normal appearance.   Neck:      Vascular: No carotid bruit.   Cardiovascular:      Rate and Rhythm: Normal rate and regular rhythm.      Heart sounds: Normal heart sounds. No murmur heard.  Pulmonary:      Effort: No respiratory distress.      Breath sounds: No wheezing or rales.   Musculoskeletal:      Right lower leg: No edema.      Left lower leg: No edema.   Lymphadenopathy:      Cervical: No cervical adenopathy.   Neurological:      General: No focal deficit present.      Mental Status: He is alert.   Psychiatric:         Attention and Perception: Attention normal.         Mood and Affect: Mood normal.         Speech: Speech normal.            Result Review :                     Assessment and Plan  "   Diagnoses and all orders for this visit:    1. Primary hypertension (Primary)  Assessment & Plan:  Blood pressure looks good continue current regimen       2. Mixed hyperlipidemia  Assessment & Plan:  Last lipid profile looked great no need to repeat blood work today.  Continue current regimen       3. Prostate cancer  Assessment & Plan:  Sounds like he had a biochemical recurrence with rising PSA.  Currently receiving external beam radiation.  We will get updated notes from his urologist.      4. Lung nodule  Assessment & Plan:  During his workup was found to have a pulmonary nodule was felt to be unusual location for metastatic lesion.  They did recommend possible follow-up CT scan in 6 months      5. Prediabetes  -     POC Glycosylated Hemoglobin (Hb A1C)        Follow Up   No follow-ups on file.  Patient was given instructions and counseling regarding his condition or for health maintenance advice. Please see specific information pulled into the AVS if appropriate.

## 2024-03-09 ENCOUNTER — TELEPHONE (OUTPATIENT)
Dept: FAMILY MEDICINE CLINIC | Age: 69
End: 2024-03-09
Payer: COMMERCIAL

## 2024-03-09 NOTE — ASSESSMENT & PLAN NOTE
During his workup was found to have a pulmonary nodule was felt to be unusual location for metastatic lesion.  They did recommend possible follow-up CT scan in 6 months

## 2024-03-09 NOTE — ASSESSMENT & PLAN NOTE
Sounds like he had a biochemical recurrence with rising PSA.  Currently receiving external beam radiation.  We will get updated notes from his urologist.

## 2024-09-05 ENCOUNTER — LAB (OUTPATIENT)
Dept: LAB | Facility: HOSPITAL | Age: 69
End: 2024-09-05
Payer: COMMERCIAL

## 2024-09-05 ENCOUNTER — OFFICE VISIT (OUTPATIENT)
Dept: FAMILY MEDICINE CLINIC | Age: 69
End: 2024-09-05
Payer: COMMERCIAL

## 2024-09-05 VITALS
TEMPERATURE: 97.9 F | WEIGHT: 180.2 LBS | HEIGHT: 73 IN | BODY MASS INDEX: 23.88 KG/M2 | SYSTOLIC BLOOD PRESSURE: 121 MMHG | DIASTOLIC BLOOD PRESSURE: 73 MMHG | HEART RATE: 53 BPM

## 2024-09-05 DIAGNOSIS — C61 PROSTATE CANCER: ICD-10-CM

## 2024-09-05 DIAGNOSIS — E78.2 MIXED HYPERLIPIDEMIA: ICD-10-CM

## 2024-09-05 DIAGNOSIS — R91.1 LUNG NODULE: ICD-10-CM

## 2024-09-05 DIAGNOSIS — R73.09 ABNORMAL GLUCOSE: ICD-10-CM

## 2024-09-05 DIAGNOSIS — I10 PRIMARY HYPERTENSION: Primary | ICD-10-CM

## 2024-09-05 LAB
ALBUMIN SERPL-MCNC: 4.7 G/DL (ref 3.5–5.2)
ALBUMIN/GLOB SERPL: 1.9 G/DL
ALP SERPL-CCNC: 67 U/L (ref 39–117)
ALT SERPL W P-5'-P-CCNC: 11 U/L (ref 1–41)
ANION GAP SERPL CALCULATED.3IONS-SCNC: 11.4 MMOL/L (ref 5–15)
AST SERPL-CCNC: 18 U/L (ref 1–40)
BASOPHILS # BLD AUTO: 0.03 10*3/MM3 (ref 0–0.2)
BASOPHILS NFR BLD AUTO: 0.7 % (ref 0–1.5)
BILIRUB SERPL-MCNC: 0.7 MG/DL (ref 0–1.2)
BUN SERPL-MCNC: 18 MG/DL (ref 8–23)
BUN/CREAT SERPL: 15.8 (ref 7–25)
CALCIUM SPEC-SCNC: 9.6 MG/DL (ref 8.6–10.5)
CHLORIDE SERPL-SCNC: 103 MMOL/L (ref 98–107)
CHOLEST SERPL-MCNC: 174 MG/DL (ref 0–200)
CO2 SERPL-SCNC: 26.6 MMOL/L (ref 22–29)
CREAT SERPL-MCNC: 1.14 MG/DL (ref 0.76–1.27)
DEPRECATED RDW RBC AUTO: 45.3 FL (ref 37–54)
EGFRCR SERPLBLD CKD-EPI 2021: 70.9 ML/MIN/1.73
EOSINOPHIL # BLD AUTO: 0.12 10*3/MM3 (ref 0–0.4)
EOSINOPHIL NFR BLD AUTO: 2.7 % (ref 0.3–6.2)
ERYTHROCYTE [DISTWIDTH] IN BLOOD BY AUTOMATED COUNT: 14.1 % (ref 12.3–15.4)
GLOBULIN UR ELPH-MCNC: 2.5 GM/DL
GLUCOSE SERPL-MCNC: 107 MG/DL (ref 65–99)
HCT VFR BLD AUTO: 41.3 % (ref 37.5–51)
HDLC SERPL-MCNC: 59 MG/DL (ref 40–60)
HGB BLD-MCNC: 13.6 G/DL (ref 13–17.7)
IMM GRANULOCYTES # BLD AUTO: 0.02 10*3/MM3 (ref 0–0.05)
IMM GRANULOCYTES NFR BLD AUTO: 0.4 % (ref 0–0.5)
LDLC SERPL CALC-MCNC: 98 MG/DL (ref 0–100)
LDLC/HDLC SERPL: 1.64 {RATIO}
LYMPHOCYTES # BLD AUTO: 0.63 10*3/MM3 (ref 0.7–3.1)
LYMPHOCYTES NFR BLD AUTO: 14.1 % (ref 19.6–45.3)
MCH RBC QN AUTO: 28.5 PG (ref 26.6–33)
MCHC RBC AUTO-ENTMCNC: 32.9 G/DL (ref 31.5–35.7)
MCV RBC AUTO: 86.6 FL (ref 79–97)
MONOCYTES # BLD AUTO: 0.57 10*3/MM3 (ref 0.1–0.9)
MONOCYTES NFR BLD AUTO: 12.7 % (ref 5–12)
NEUTROPHILS NFR BLD AUTO: 3.11 10*3/MM3 (ref 1.7–7)
NEUTROPHILS NFR BLD AUTO: 69.4 % (ref 42.7–76)
PLATELET # BLD AUTO: 236 10*3/MM3 (ref 140–450)
PMV BLD AUTO: 9.5 FL (ref 6–12)
POTASSIUM SERPL-SCNC: 3.9 MMOL/L (ref 3.5–5.2)
PROT SERPL-MCNC: 7.2 G/DL (ref 6–8.5)
RBC # BLD AUTO: 4.77 10*6/MM3 (ref 4.14–5.8)
SODIUM SERPL-SCNC: 141 MMOL/L (ref 136–145)
TRIGL SERPL-MCNC: 91 MG/DL (ref 0–150)
VLDLC SERPL-MCNC: 17 MG/DL (ref 5–40)
WBC NRBC COR # BLD AUTO: 4.48 10*3/MM3 (ref 3.4–10.8)

## 2024-09-05 PROCEDURE — 85025 COMPLETE CBC W/AUTO DIFF WBC: CPT | Performed by: FAMILY MEDICINE

## 2024-09-05 PROCEDURE — 80061 LIPID PANEL: CPT | Performed by: FAMILY MEDICINE

## 2024-09-05 PROCEDURE — 80053 COMPREHEN METABOLIC PANEL: CPT | Performed by: FAMILY MEDICINE

## 2024-09-05 PROCEDURE — 36415 COLL VENOUS BLD VENIPUNCTURE: CPT | Performed by: FAMILY MEDICINE

## 2024-09-05 PROCEDURE — 99214 OFFICE O/P EST MOD 30 MIN: CPT | Performed by: FAMILY MEDICINE

## 2024-09-05 NOTE — PROGRESS NOTES
"Chief Complaint  Hypertension    Subjective          Dariusz Cameron III presents to [facility]  History of Present Illness      Has had prostate cancer.  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  Reviewed urology note from August 28.  Hd a CT scan that showed a pulmonary nodule and is to have a repeat scan in December.  Most recent PSA was undetectable.    As far as his blood pressure tolerating his medication well.  Blood pressure looks good today.    Also has hypercholesterolemia and tolerating the medication.  Due for labs today.    Current Outpatient Medications on File Prior to Visit   Medication Sig Dispense Refill    lisinopril-hydrochlorothiazide (PRINZIDE,ZESTORETIC) 20-12.5 MG per tablet TAKE ONE TABLET BY MOUTH DAILY 90 tablet 1    sildenafil (REVATIO) 20 MG tablet TAKE 1 TO 5 TABLETS BY MOUTH AS NEEDED      simvastatin (ZOCOR) 20 MG tablet TAKE ONE TABLET BY MOUTH DAILY 90 tablet 1     No current facility-administered medications on file prior to visit.       Review of Systems         Objective   Vital Signs:   /73 (BP Location: Left arm, Patient Position: Sitting)   Pulse 53   Temp 97.9 °F (36.6 °C) (Temporal)   Ht 185.4 cm (72.99\")   Wt 81.7 kg (180 lb 3.2 oz)   BMI 23.78 kg/m²     Physical Exam  Constitutional:       Appearance: Normal appearance.   Neck:      Vascular: No carotid bruit.   Cardiovascular:      Rate and Rhythm: Normal rate and regular rhythm.      Heart sounds: Normal heart sounds. No murmur heard.  Pulmonary:      Effort: No respiratory distress.      Breath sounds: No wheezing or rales.   Musculoskeletal:      Right lower leg: No edema.      Left lower leg: No edema.   Lymphadenopathy:      Cervical: No cervical adenopathy.   Neurological:      General: No focal deficit present.      Mental Status: He is alert.   Psychiatric:         Attention and Perception: Attention normal.         Mood and Affect: " Mood normal.         Speech: Speech normal.            Result Review :                     Assessment and Plan    Diagnoses and all orders for this visit:    1. Primary hypertension (Primary)  Assessment & Plan:  Blood pressure is doing okay continue current regimen     Orders:  -     Comprehensive Metabolic Panel  -     CBC & Differential    2. Mixed hyperlipidemia  Assessment & Plan:  Check lab predicate medication change based on result     Orders:  -     Comprehensive Metabolic Panel  -     Lipid Panel    3. Prostate cancer  Assessment & Plan:  Reviewed note from urology.  Continue to follow with him as recommended.      4. Lung nodule  Assessment & Plan:  Due for follow-up CT in December.          Follow Up   No follow-ups on file.  Patient was given instructions and counseling regarding his condition or for health maintenance advice. Please see specific information pulled into the AVS if appropriate.

## 2024-09-06 DIAGNOSIS — R73.09 ABNORMAL GLUCOSE: Primary | ICD-10-CM

## 2024-09-09 LAB — HBA1C MFR BLD: 5.8 % (ref 4.8–5.6)

## 2024-09-14 ENCOUNTER — TELEPHONE (OUTPATIENT)
Dept: FAMILY MEDICINE CLINIC | Age: 69
End: 2024-09-14
Payer: COMMERCIAL

## 2024-09-14 NOTE — TELEPHONE ENCOUNTER
Asked the patient if urology had performed CT of the chest subsequent to his PET scan in January.  At patient's recent visit he mentioned a CT scan needing to be repeated in December  so I assume that he must of had 1 in June but it is not exactly clear by reviewing the urology note.  If he has not had a CT scan since the PET scan just let him know that he may want to discuss with urology since the PET scan result had recommended a 6-month follow-up.  Urology's note mentions CT scan in December.  He can discuss with urology if they think it is most appropriate to wait until December or to pursue a follow-up CT now.    mas

## 2024-11-13 DIAGNOSIS — I10 PRIMARY HYPERTENSION: ICD-10-CM

## 2024-11-13 DIAGNOSIS — E78.2 MIXED HYPERLIPIDEMIA: ICD-10-CM

## 2024-11-14 RX ORDER — SIMVASTATIN 20 MG
20 TABLET ORAL DAILY
Qty: 90 TABLET | Refills: 1 | Status: SHIPPED | OUTPATIENT
Start: 2024-11-14

## 2024-11-14 RX ORDER — LISINOPRIL AND HYDROCHLOROTHIAZIDE 12.5; 2 MG/1; MG/1
1 TABLET ORAL DAILY
Qty: 90 TABLET | Refills: 1 | Status: SHIPPED | OUTPATIENT
Start: 2024-11-14

## 2025-03-04 ENCOUNTER — LAB (OUTPATIENT)
Dept: LAB | Facility: HOSPITAL | Age: 70
End: 2025-03-04
Payer: MEDICARE

## 2025-03-04 ENCOUNTER — OFFICE VISIT (OUTPATIENT)
Dept: FAMILY MEDICINE CLINIC | Age: 70
End: 2025-03-04
Payer: MEDICARE

## 2025-03-04 VITALS
BODY MASS INDEX: 24.68 KG/M2 | HEART RATE: 53 BPM | WEIGHT: 186.2 LBS | OXYGEN SATURATION: 100 % | TEMPERATURE: 97.7 F | HEIGHT: 73 IN | SYSTOLIC BLOOD PRESSURE: 130 MMHG | DIASTOLIC BLOOD PRESSURE: 74 MMHG

## 2025-03-04 DIAGNOSIS — I10 PRIMARY HYPERTENSION: Primary | ICD-10-CM

## 2025-03-04 DIAGNOSIS — C61 PROSTATE CANCER: ICD-10-CM

## 2025-03-04 DIAGNOSIS — R73.03 PREDIABETES: ICD-10-CM

## 2025-03-04 DIAGNOSIS — I49.9 CARDIAC ARRHYTHMIA, UNSPECIFIED CARDIAC ARRHYTHMIA TYPE: ICD-10-CM

## 2025-03-04 DIAGNOSIS — I45.10 RBBB (RIGHT BUNDLE BRANCH BLOCK): ICD-10-CM

## 2025-03-04 DIAGNOSIS — E78.2 MIXED HYPERLIPIDEMIA: ICD-10-CM

## 2025-03-04 LAB
ALBUMIN SERPL-MCNC: 4.5 G/DL (ref 3.5–5.2)
ALBUMIN/GLOB SERPL: 1.9 G/DL
ALP SERPL-CCNC: 69 U/L (ref 39–117)
ALT SERPL W P-5'-P-CCNC: 15 U/L (ref 1–41)
ANION GAP SERPL CALCULATED.3IONS-SCNC: 10 MMOL/L (ref 5–15)
AST SERPL-CCNC: 22 U/L (ref 1–40)
BASOPHILS # BLD AUTO: 0.03 10*3/MM3 (ref 0–0.2)
BASOPHILS NFR BLD AUTO: 0.7 % (ref 0–1.5)
BILIRUB SERPL-MCNC: 0.9 MG/DL (ref 0–1.2)
BUN SERPL-MCNC: 18 MG/DL (ref 8–23)
BUN/CREAT SERPL: 16.8 (ref 7–25)
CALCIUM SPEC-SCNC: 9.6 MG/DL (ref 8.6–10.5)
CHLORIDE SERPL-SCNC: 100 MMOL/L (ref 98–107)
CHOLEST SERPL-MCNC: 200 MG/DL (ref 0–200)
CO2 SERPL-SCNC: 27 MMOL/L (ref 22–29)
CREAT SERPL-MCNC: 1.07 MG/DL (ref 0.76–1.27)
DEPRECATED RDW RBC AUTO: 44.5 FL (ref 37–54)
EGFRCR SERPLBLD CKD-EPI 2021: 76.1 ML/MIN/1.73
EOSINOPHIL # BLD AUTO: 0.14 10*3/MM3 (ref 0–0.4)
EOSINOPHIL NFR BLD AUTO: 3.1 % (ref 0.3–6.2)
ERYTHROCYTE [DISTWIDTH] IN BLOOD BY AUTOMATED COUNT: 13.9 % (ref 12.3–15.4)
GLOBULIN UR ELPH-MCNC: 2.4 GM/DL
GLUCOSE SERPL-MCNC: 103 MG/DL (ref 65–99)
HCT VFR BLD AUTO: 43.2 % (ref 37.5–51)
HDLC SERPL-MCNC: 61 MG/DL (ref 40–60)
HGB BLD-MCNC: 13.7 G/DL (ref 13–17.7)
IMM GRANULOCYTES # BLD AUTO: 0.02 10*3/MM3 (ref 0–0.05)
IMM GRANULOCYTES NFR BLD AUTO: 0.4 % (ref 0–0.5)
LDLC SERPL CALC-MCNC: 122 MG/DL (ref 0–100)
LDLC/HDLC SERPL: 1.97 {RATIO}
LYMPHOCYTES # BLD AUTO: 0.75 10*3/MM3 (ref 0.7–3.1)
LYMPHOCYTES NFR BLD AUTO: 16.6 % (ref 19.6–45.3)
MCH RBC QN AUTO: 27.2 PG (ref 26.6–33)
MCHC RBC AUTO-ENTMCNC: 31.7 G/DL (ref 31.5–35.7)
MCV RBC AUTO: 85.9 FL (ref 79–97)
MONOCYTES # BLD AUTO: 0.66 10*3/MM3 (ref 0.1–0.9)
MONOCYTES NFR BLD AUTO: 14.6 % (ref 5–12)
NEUTROPHILS NFR BLD AUTO: 2.92 10*3/MM3 (ref 1.7–7)
NEUTROPHILS NFR BLD AUTO: 64.6 % (ref 42.7–76)
PLATELET # BLD AUTO: 250 10*3/MM3 (ref 140–450)
PMV BLD AUTO: 8.9 FL (ref 6–12)
POTASSIUM SERPL-SCNC: 5 MMOL/L (ref 3.5–5.2)
PROT SERPL-MCNC: 6.9 G/DL (ref 6–8.5)
RBC # BLD AUTO: 5.03 10*6/MM3 (ref 4.14–5.8)
SODIUM SERPL-SCNC: 137 MMOL/L (ref 136–145)
TRIGL SERPL-MCNC: 93 MG/DL (ref 0–150)
TSH SERPL DL<=0.05 MIU/L-ACNC: 1.38 UIU/ML (ref 0.27–4.2)
VLDLC SERPL-MCNC: 17 MG/DL (ref 5–40)
WBC NRBC COR # BLD AUTO: 4.52 10*3/MM3 (ref 3.4–10.8)

## 2025-03-04 PROCEDURE — 36415 COLL VENOUS BLD VENIPUNCTURE: CPT | Performed by: FAMILY MEDICINE

## 2025-03-04 PROCEDURE — 93000 ELECTROCARDIOGRAM COMPLETE: CPT | Performed by: FAMILY MEDICINE

## 2025-03-04 PROCEDURE — 3078F DIAST BP <80 MM HG: CPT | Performed by: FAMILY MEDICINE

## 2025-03-04 PROCEDURE — 80061 LIPID PANEL: CPT | Performed by: FAMILY MEDICINE

## 2025-03-04 PROCEDURE — 85025 COMPLETE CBC W/AUTO DIFF WBC: CPT | Performed by: FAMILY MEDICINE

## 2025-03-04 PROCEDURE — 83036 HEMOGLOBIN GLYCOSYLATED A1C: CPT | Performed by: FAMILY MEDICINE

## 2025-03-04 PROCEDURE — 99214 OFFICE O/P EST MOD 30 MIN: CPT | Performed by: FAMILY MEDICINE

## 2025-03-04 PROCEDURE — 3075F SYST BP GE 130 - 139MM HG: CPT | Performed by: FAMILY MEDICINE

## 2025-03-04 PROCEDURE — 84443 ASSAY THYROID STIM HORMONE: CPT | Performed by: FAMILY MEDICINE

## 2025-03-04 PROCEDURE — 80053 COMPREHEN METABOLIC PANEL: CPT | Performed by: FAMILY MEDICINE

## 2025-03-04 NOTE — PROGRESS NOTES
"Chief Complaint  Hyperlipidemia and Hypertension    Subjective          Dariusz Cameron III presents to [facility]  History of Present Illness      Has had prostate cancer.  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  Reviewed urology note from August 28.  Previous CT scan showed a pulmonary nodule.  PET scan performed in January 2024 yielded a 5 mm left upper lobe nodule 2 small to accurately characterize but was favored to be inflammatory.  Follow-up CTs through urology office have shown the nodule to be stable.    Regards to his prostate cancer he says that urology has let him decrease frequency of follow-up to every 6-month.  PSA remains undetectable.      Blood pressure today looks great.  Occasionally checks blood pressure at home always good.          Current Outpatient Medications on File Prior to Visit   Medication Sig Dispense Refill    lisinopril-hydrochlorothiazide (PRINZIDE,ZESTORETIC) 20-12.5 MG per tablet TAKE 1 TABLET BY MOUTH DAILY 90 tablet 1    sildenafil (REVATIO) 20 MG tablet TAKE 1 TO 5 TABLETS BY MOUTH AS NEEDED      simvastatin (ZOCOR) 20 MG tablet TAKE 1 TABLET BY MOUTH DAILY 90 tablet 1     No current facility-administered medications on file prior to visit.       Review of Systems         Objective   Vital Signs:   /74 (BP Location: Right arm, Patient Position: Sitting)   Pulse 53   Temp 97.7 °F (36.5 °C) (Temporal)   Ht 185.4 cm (72.99\")   Wt 84.5 kg (186 lb 3.2 oz)   SpO2 100%   BMI 24.57 kg/m²     Physical Exam  Constitutional:       Appearance: Normal appearance.   Neck:      Vascular: No carotid bruit.   Cardiovascular:      Rate and Rhythm: Normal rate. Occasional Extrasystoles are present.     Heart sounds: Normal heart sounds. No murmur heard.  Pulmonary:      Effort: No respiratory distress.      Breath sounds: No wheezing or rales.   Musculoskeletal:      Right lower leg: No edema.      Left lower leg: " No edema.   Lymphadenopathy:      Cervical: No cervical adenopathy.   Neurological:      General: No focal deficit present.      Mental Status: He is alert.   Psychiatric:         Attention and Perception: Attention normal.         Mood and Affect: Mood normal.         Speech: Speech normal.            Result Review :                ECG 12 Lead    Date/Time: 3/4/2025 10:22 AM  Performed by: Jeffrey Poon MD    Authorized by: Jeffrey Poon MD  Comparison: not compared with previous ECG   Previous ECG: no previous ECG available  Rhythm: sinus rhythm  Rate: normal  Conduction: right bundle branch block  ST Segments: ST segments normal  T Waves: T waves normal  QRS axis: normal    Clinical impression: abnormal EKG  Comments: Report from 12/12/2016 [facility] also revealed RBBB            Assessment and Plan    Diagnoses and all orders for this visit:    1. Primary hypertension (Primary)  Assessment & Plan:  Pressure looks great continue current     Orders:  -     Comprehensive Metabolic Panel  -     CBC Auto Differential    2. Mixed hyperlipidemia  Assessment & Plan:  Will check lab predicate medication change based on result     Orders:  -     Lipid Panel  -     Comprehensive Metabolic Panel    3. Prostate cancer  Assessment & Plan:  Currently no evidence of disease.  Continue to follow with urology and radiation oncology as recommended      4. Prediabetes  Assessment & Plan:  Reviewed again results of labs revealing prediabetic state.  Paying attention to your diet, keeping your weight under control, and getting regular exercise are the best methods to prevent progression into full-blown diabetes.       Orders:  -     Hemoglobin A1c    5. Cardiac arrhythmia, unspecified cardiac arrhythmia type  -     ECG 12 Lead  -     TSH Rfx On Abnormal To Free T4    6. RBBB (right bundle branch block)  Assessment & Plan:  EKG did not show any ectopy but does show a right bundle branch block.  This is a  chronic finding at least back to 2016          Follow Up   No follow-ups on file.  Patient was given instructions and counseling regarding his condition or for health maintenance advice. Please see specific information pulled into the AVS if appropriate.

## 2025-03-04 NOTE — ASSESSMENT & PLAN NOTE
EKG did not show any ectopy but does show a right bundle branch block.  This is a chronic finding at least back to 2016

## 2025-03-04 NOTE — ASSESSMENT & PLAN NOTE
Currently no evidence of disease.  Continue to follow with urology and radiation oncology as recommended

## 2025-03-04 NOTE — ASSESSMENT & PLAN NOTE
Reviewed again results of labs revealing prediabetic state.  Paying attention to your diet, keeping your weight under control, and getting regular exercise are the best methods to prevent progression into full-blown diabetes.

## 2025-03-05 LAB — HBA1C MFR BLD: 6 % (ref 4.8–5.6)

## 2025-03-06 DIAGNOSIS — E78.2 MIXED HYPERLIPIDEMIA: Primary | ICD-10-CM

## 2025-03-06 RX ORDER — ROSUVASTATIN CALCIUM 20 MG/1
20 TABLET, COATED ORAL DAILY
Qty: 90 TABLET | Refills: 0 | Status: SHIPPED | OUTPATIENT
Start: 2025-03-06

## 2025-03-10 ENCOUNTER — RESULTS FOLLOW-UP (OUTPATIENT)
Dept: FAMILY MEDICINE CLINIC | Age: 70
End: 2025-03-10
Payer: MEDICARE